# Patient Record
Sex: MALE | Race: WHITE | NOT HISPANIC OR LATINO
[De-identification: names, ages, dates, MRNs, and addresses within clinical notes are randomized per-mention and may not be internally consistent; named-entity substitution may affect disease eponyms.]

---

## 2017-01-17 ENCOUNTER — APPOINTMENT (OUTPATIENT)
Dept: INTERNAL MEDICINE | Facility: CLINIC | Age: 62
End: 2017-01-17

## 2017-03-07 ENCOUNTER — EMERGENCY (EMERGENCY)
Facility: HOSPITAL | Age: 62
LOS: 1 days | Discharge: HOME | End: 2017-03-07
Attending: EMERGENCY MEDICINE | Admitting: EMERGENCY MEDICINE
Payer: COMMERCIAL

## 2017-03-07 VITALS — WEIGHT: 209 LBS

## 2017-03-07 DIAGNOSIS — T18.9XXA FOREIGN BODY OF ALIMENTARY TRACT, PART UNSPECIFIED, INITIAL ENCOUNTER: ICD-10-CM

## 2017-03-07 LAB
ALBUMIN SERPL ELPH-MCNC: 5 G/DL — SIGNIFICANT CHANGE UP (ref 3.3–5.2)
ALP SERPL-CCNC: 69 U/L — SIGNIFICANT CHANGE UP (ref 40–120)
ALT FLD-CCNC: 23 U/L — SIGNIFICANT CHANGE UP
ANION GAP SERPL CALC-SCNC: 13 MMOL/L — SIGNIFICANT CHANGE UP (ref 5–17)
APPEARANCE UR: CLEAR — SIGNIFICANT CHANGE UP
APTT BLD: 39.1 SEC — HIGH (ref 27.5–37.4)
AST SERPL-CCNC: 21 U/L — SIGNIFICANT CHANGE UP
BASOPHILS # BLD AUTO: 0 K/UL — SIGNIFICANT CHANGE UP (ref 0–0.2)
BASOPHILS NFR BLD AUTO: 0.5 % — SIGNIFICANT CHANGE UP (ref 0–2)
BILIRUB SERPL-MCNC: 0.6 MG/DL — SIGNIFICANT CHANGE UP (ref 0.4–2)
BILIRUB UR-MCNC: NEGATIVE — SIGNIFICANT CHANGE UP
BUN SERPL-MCNC: 25 MG/DL — HIGH (ref 8–20)
CALCIUM SERPL-MCNC: 9.8 MG/DL — SIGNIFICANT CHANGE UP (ref 8.6–10.2)
CHLORIDE SERPL-SCNC: 95 MMOL/L — LOW (ref 98–107)
CO2 SERPL-SCNC: 28 MMOL/L — SIGNIFICANT CHANGE UP (ref 22–29)
COLOR SPEC: YELLOW — SIGNIFICANT CHANGE UP
CREAT SERPL-MCNC: 0.8 MG/DL — SIGNIFICANT CHANGE UP (ref 0.5–1.3)
DIFF PNL FLD: NEGATIVE — SIGNIFICANT CHANGE UP
EOSINOPHIL # BLD AUTO: 0 K/UL — SIGNIFICANT CHANGE UP (ref 0–0.5)
EOSINOPHIL NFR BLD AUTO: 0.5 % — SIGNIFICANT CHANGE UP (ref 0–5)
GLUCOSE SERPL-MCNC: 110 MG/DL — SIGNIFICANT CHANGE UP (ref 70–115)
GLUCOSE UR QL: NEGATIVE MG/DL — SIGNIFICANT CHANGE UP
HCT VFR BLD CALC: 41.3 % — LOW (ref 42–52)
HGB BLD-MCNC: 14.5 G/DL — SIGNIFICANT CHANGE UP (ref 14–18)
INR BLD: 1.19 RATIO — HIGH (ref 0.88–1.16)
KETONES UR-MCNC: NEGATIVE — SIGNIFICANT CHANGE UP
LEUKOCYTE ESTERASE UR-ACNC: NEGATIVE — SIGNIFICANT CHANGE UP
LIDOCAIN IGE QN: 25 U/L — SIGNIFICANT CHANGE UP (ref 22–51)
LYMPHOCYTES # BLD AUTO: 1.3 K/UL — SIGNIFICANT CHANGE UP (ref 1–4.8)
LYMPHOCYTES # BLD AUTO: 20.3 % — SIGNIFICANT CHANGE UP (ref 20–55)
MCHC RBC-ENTMCNC: 30.2 PG — SIGNIFICANT CHANGE UP (ref 27–31)
MCHC RBC-ENTMCNC: 35.1 G/DL — SIGNIFICANT CHANGE UP (ref 32–36)
MCV RBC AUTO: 86 FL — SIGNIFICANT CHANGE UP (ref 80–94)
MONOCYTES # BLD AUTO: 0.5 K/UL — SIGNIFICANT CHANGE UP (ref 0–0.8)
MONOCYTES NFR BLD AUTO: 7.5 % — SIGNIFICANT CHANGE UP (ref 3–10)
NEUTROPHILS # BLD AUTO: 4.4 K/UL — SIGNIFICANT CHANGE UP (ref 1.8–8)
NEUTROPHILS NFR BLD AUTO: 71 % — SIGNIFICANT CHANGE UP (ref 37–73)
NITRITE UR-MCNC: NEGATIVE — SIGNIFICANT CHANGE UP
PH UR: 6 — SIGNIFICANT CHANGE UP (ref 4.8–8)
PLATELET # BLD AUTO: 225 K/UL — SIGNIFICANT CHANGE UP (ref 150–400)
POTASSIUM SERPL-MCNC: 3.9 MMOL/L — SIGNIFICANT CHANGE UP (ref 3.5–5.3)
POTASSIUM SERPL-SCNC: 3.9 MMOL/L — SIGNIFICANT CHANGE UP (ref 3.5–5.3)
PROT SERPL-MCNC: 7.9 G/DL — SIGNIFICANT CHANGE UP (ref 6.6–8.7)
PROT UR-MCNC: NEGATIVE MG/DL — SIGNIFICANT CHANGE UP
PROTHROM AB SERPL-ACNC: 13.1 SEC — SIGNIFICANT CHANGE UP (ref 10–13.1)
RBC # BLD: 4.8 M/UL — SIGNIFICANT CHANGE UP (ref 4.6–6.2)
RBC # FLD: 12.8 % — SIGNIFICANT CHANGE UP (ref 11–15.6)
SODIUM SERPL-SCNC: 136 MMOL/L — SIGNIFICANT CHANGE UP (ref 135–145)
SP GR SPEC: 1.01 — SIGNIFICANT CHANGE UP (ref 1.01–1.02)
UROBILINOGEN FLD QL: NEGATIVE MG/DL — SIGNIFICANT CHANGE UP
WBC # BLD: 6.2 K/UL — SIGNIFICANT CHANGE UP (ref 4.8–10.8)
WBC # FLD AUTO: 6.2 K/UL — SIGNIFICANT CHANGE UP (ref 4.8–10.8)

## 2017-03-07 PROCEDURE — 99284 EMERGENCY DEPT VISIT MOD MDM: CPT

## 2017-03-07 PROCEDURE — 74176 CT ABD & PELVIS W/O CONTRAST: CPT | Mod: 26

## 2017-03-07 RX ORDER — KETOROLAC TROMETHAMINE 30 MG/ML
30 SYRINGE (ML) INJECTION ONCE
Qty: 0 | Refills: 0 | Status: DISCONTINUED | OUTPATIENT
Start: 2017-03-07 | End: 2017-03-07

## 2017-03-07 RX ORDER — DOCUSATE SODIUM 100 MG
100 CAPSULE ORAL ONCE
Qty: 0 | Refills: 0 | Status: COMPLETED | OUTPATIENT
Start: 2017-03-07 | End: 2017-03-07

## 2017-03-07 RX ORDER — SODIUM CHLORIDE 9 MG/ML
3 INJECTION INTRAMUSCULAR; INTRAVENOUS; SUBCUTANEOUS ONCE
Qty: 0 | Refills: 0 | Status: COMPLETED | OUTPATIENT
Start: 2017-03-07 | End: 2017-03-07

## 2017-03-07 RX ORDER — SODIUM CHLORIDE 9 MG/ML
1000 INJECTION INTRAMUSCULAR; INTRAVENOUS; SUBCUTANEOUS
Qty: 0 | Refills: 0 | Status: DISCONTINUED | OUTPATIENT
Start: 2017-03-07 | End: 2017-03-11

## 2017-03-07 RX ORDER — ONDANSETRON 8 MG/1
4 TABLET, FILM COATED ORAL ONCE
Qty: 0 | Refills: 0 | Status: COMPLETED | OUTPATIENT
Start: 2017-03-07 | End: 2017-03-07

## 2017-03-07 RX ADMIN — SODIUM CHLORIDE 125 MILLILITER(S): 9 INJECTION INTRAMUSCULAR; INTRAVENOUS; SUBCUTANEOUS at 20:04

## 2017-03-07 RX ADMIN — SODIUM CHLORIDE 3 MILLILITER(S): 9 INJECTION INTRAMUSCULAR; INTRAVENOUS; SUBCUTANEOUS at 19:20

## 2017-03-07 NOTE — CONSULT NOTE ADULT - ATTENDING COMMENTS
The patient was seen and examined  Details per the resident's H&P  This is a 62-year old man who presents to the ED because of a concern for FB ingestion  The patient thinks he ingested pieces of a broken mug (Glass)  No other complaints    Exam:  Afebrile  Abdomen is soft, non-tender    CT imaging noted/reviewed    Impression:  FB ingestion    Plan:  Discharge home  Discussed signs and symptoms to return to the ED--i.e., any abdominal pain  Reassured patient that the fragments will pass without problems

## 2017-03-07 NOTE — ED STATDOCS - PROGRESS NOTE DETAILS
PA NOTE: Pt seen by intake physician and orders/plan reviewed. PT is a 61 yo male with pMHX of HTN presenting to ED with complaints of possible glass ingestion. pt reports a glass broke sunday and he thought he possibly could  have swallowed the shard due to inability to find it after. pt reports stabbing abdominal pain at baseline but reports stabbing pain that moved today from RUQ to LLQ. pt denies fever, chills, cp, sob, melena, N/V/D. NKDA  PE: GEN: Awake, alert,  NAD,  CARDIAC: Reg rate and rhythm, S1,S2, RRR  RESP: No distress noted. Lungs CTA bilaterally no wheeze, ronchi, rales. ABD: + bsx4 soft, supple, non-tender, no guarding. . NEURO: AOx3, no focal deficits   PLAN: Pt is a 63yo male with possible FB ingestion. CT pending. Labs pending. will re-evaluate. Pt stable. CT shows possible glass in bowel. surgery consulted and will come see pt. Pt seen and evaluated by surgery. Surgery advised pt can go home and follow up out pt. they advised pt to return for worsening symptoms. will d./c pt pt verbalized understanding and agreement with plan and dx.

## 2017-03-07 NOTE — ED ADULT TRIAGE NOTE - CHIEF COMPLAINT QUOTE
pt reports pian to his left lower abdomen since Sunday. pt thinks he may have ingested a shard of glass on saturday. pt denies nausea vomiting or disrrhea

## 2017-03-07 NOTE — CONSULT NOTE ADULT - SUBJECTIVE AND OBJECTIVE BOX
63 y/o M with PMH of HTN presented with h/o possible ingestion of a piece if glass on Sunday. He states that a piece of glass was broken and when he went to clean it then next day, he couldnt find it and thought he accidently ingested it while drinking. he denies any discomfort while he was drinking. he says that he has not seen it passed in the stool and now complaints of some mild discomfort over his abdomen. he denies nausea/vomitng  stool/urine normal    OE:  pt alert, Ox3  Chest: CTAB, S1S2  abd: soft, non tender, non distended  no guarding and no rigidity    Plain CT: Several small radiopacities are noted within the mid jejunum possibly  representing swallowed glass fragments. No evidence of inflammation or  perforation. Otherwise unremarkable exam..

## 2017-03-07 NOTE — ED STATDOCS - NS ED MD SCRIBE ATTENDING SCRIBE SECTIONS
PAST MEDICAL/SURGICAL/SOCIAL HISTORY/HISTORY OF PRESENT ILLNESS/REVIEW OF SYSTEMS/PHYSICAL EXAM/INTAKE ASSESSMENT/SCREENINGS/VITAL SIGNS( Pullset)/DISPOSITION/HIV

## 2017-03-07 NOTE — ED STATDOCS - ATTENDING CONTRIBUTION TO CARE
I, Amos Yanez, performed the initial face to face bedside interview with this patient regarding history of present illness, review of symptoms and relevant past medical, social and family history.  I completed an independent physical examination.  I was the initial provider who evaluated this patient. I have signed out the follow up of any pending tests (i.e. labs, radiological studies) to the ACP.  I have communicated the patient’s plan of care and disposition with the ACP.  The history, relevant review of systems, past medical and surgical history, medical decision making, and physical examination was documented by the scribe in my presence and I attest to the accuracy of the documentation.

## 2017-03-07 NOTE — CONSULT NOTE ADULT - PROBLEM SELECTOR RECOMMENDATION 9
pt to be discharged home  return to ED if having severe abd pain, nausea, vomiting , blood in stool  pt explained about the plan

## 2017-03-07 NOTE — ED STATDOCS - OBJECTIVE STATEMENT
63 y/o M pt presents to ED c/o abdominal pain. Pt states he possible ingested a piece of glass 3 days ago. Last movement today. No nausea, vomiting, diarrhea. No rectal bleeding. No further complaints at this time. 61 y/o M pt presents to ED c/o abdominal pain. Pt states he possibly ingested a piece of glass 3 days ago. Last movement today. No nausea, vomiting, diarrhea. No rectal bleeding. No further complaints at this time.

## 2017-03-08 ENCOUNTER — APPOINTMENT (OUTPATIENT)
Dept: INTERNAL MEDICINE | Facility: CLINIC | Age: 62
End: 2017-03-08

## 2017-03-08 VITALS
OXYGEN SATURATION: 98 % | DIASTOLIC BLOOD PRESSURE: 62 MMHG | SYSTOLIC BLOOD PRESSURE: 128 MMHG | HEART RATE: 82 BPM | RESPIRATION RATE: 17 BRPM | TEMPERATURE: 99 F

## 2017-03-08 PROCEDURE — 80053 COMPREHEN METABOLIC PANEL: CPT

## 2017-03-08 PROCEDURE — 81003 URINALYSIS AUTO W/O SCOPE: CPT

## 2017-03-08 PROCEDURE — 85027 COMPLETE CBC AUTOMATED: CPT

## 2017-03-08 PROCEDURE — 83690 ASSAY OF LIPASE: CPT

## 2017-03-08 PROCEDURE — 99284 EMERGENCY DEPT VISIT MOD MDM: CPT | Mod: 25

## 2017-03-08 PROCEDURE — 74176 CT ABD & PELVIS W/O CONTRAST: CPT

## 2017-03-08 PROCEDURE — 85730 THROMBOPLASTIN TIME PARTIAL: CPT

## 2017-03-08 PROCEDURE — 85610 PROTHROMBIN TIME: CPT

## 2017-03-08 RX ADMIN — Medication 100 MILLIGRAM(S): at 00:02

## 2017-03-08 NOTE — ED ADULT NURSE REASSESSMENT NOTE - NS ED NURSE REASSESS COMMENT FT1
Pt received in chair in Ed results waiting area. Pt is awaiting vital signs and DC paper work. All provided to pt. Pt denies pain at this time. No apparent distress noted.

## 2017-06-28 ENCOUNTER — APPOINTMENT (OUTPATIENT)
Dept: INTERNAL MEDICINE | Facility: CLINIC | Age: 62
End: 2017-06-28

## 2017-08-29 ENCOUNTER — APPOINTMENT (OUTPATIENT)
Dept: INTERNAL MEDICINE | Facility: CLINIC | Age: 62
End: 2017-08-29

## 2017-10-31 ENCOUNTER — APPOINTMENT (OUTPATIENT)
Dept: GASTROENTEROLOGY | Facility: CLINIC | Age: 62
End: 2017-10-31

## 2018-02-27 ENCOUNTER — APPOINTMENT (OUTPATIENT)
Dept: GASTROENTEROLOGY | Facility: CLINIC | Age: 63
End: 2018-02-27
Payer: COMMERCIAL

## 2018-02-27 VITALS
DIASTOLIC BLOOD PRESSURE: 77 MMHG | WEIGHT: 195 LBS | SYSTOLIC BLOOD PRESSURE: 122 MMHG | HEART RATE: 73 BPM | BODY MASS INDEX: 26.41 KG/M2 | RESPIRATION RATE: 14 BRPM | HEIGHT: 72 IN

## 2018-02-27 PROCEDURE — 82270 OCCULT BLOOD FECES: CPT

## 2018-02-27 PROCEDURE — 99202 OFFICE O/P NEW SF 15 MIN: CPT

## 2018-03-27 ENCOUNTER — OUTPATIENT (OUTPATIENT)
Dept: OUTPATIENT SERVICES | Facility: HOSPITAL | Age: 63
LOS: 1 days | End: 2018-03-27
Payer: COMMERCIAL

## 2018-03-27 ENCOUNTER — APPOINTMENT (OUTPATIENT)
Dept: GASTROENTEROLOGY | Facility: GI CENTER | Age: 63
End: 2018-03-27
Payer: COMMERCIAL

## 2018-03-27 DIAGNOSIS — Z83.71 FAMILY HISTORY OF COLONIC POLYPS: ICD-10-CM

## 2018-03-27 PROCEDURE — 45378 DIAGNOSTIC COLONOSCOPY: CPT

## 2018-03-27 PROCEDURE — 45380 COLONOSCOPY AND BIOPSY: CPT | Mod: PT

## 2018-03-29 ENCOUNTER — CLINICAL ADVICE (OUTPATIENT)
Age: 63
End: 2018-03-29

## 2018-04-11 ENCOUNTER — APPOINTMENT (OUTPATIENT)
Dept: INTERNAL MEDICINE | Facility: CLINIC | Age: 63
End: 2018-04-11

## 2018-04-12 ENCOUNTER — APPOINTMENT (OUTPATIENT)
Dept: INTERNAL MEDICINE | Facility: CLINIC | Age: 63
End: 2018-04-12
Payer: COMMERCIAL

## 2018-04-12 PROCEDURE — 99213 OFFICE O/P EST LOW 20 MIN: CPT

## 2018-07-18 ENCOUNTER — RECORD ABSTRACTING (OUTPATIENT)
Age: 63
End: 2018-07-18

## 2018-07-18 DIAGNOSIS — Z78.9 OTHER SPECIFIED HEALTH STATUS: ICD-10-CM

## 2018-08-01 ENCOUNTER — APPOINTMENT (OUTPATIENT)
Dept: INTERNAL MEDICINE | Facility: CLINIC | Age: 63
End: 2018-08-01
Payer: COMMERCIAL

## 2018-08-01 VITALS
DIASTOLIC BLOOD PRESSURE: 80 MMHG | HEIGHT: 72 IN | SYSTOLIC BLOOD PRESSURE: 140 MMHG | WEIGHT: 174 LBS | BODY MASS INDEX: 23.57 KG/M2

## 2018-08-01 PROBLEM — Z83.71 FAMILY HISTORY OF COLONIC POLYPS: Status: ACTIVE | Noted: 2018-02-27

## 2018-08-01 PROBLEM — Z86.79 HISTORY OF HYPERTENSION: Status: RESOLVED | Noted: 2018-02-27 | Resolved: 2018-08-01

## 2018-08-01 PROCEDURE — 99213 OFFICE O/P EST LOW 20 MIN: CPT

## 2018-08-01 RX ORDER — VALSARTAN 320 MG/1
320 TABLET ORAL
Refills: 0 | Status: DISCONTINUED | COMMUNITY
End: 2018-08-01

## 2018-08-01 RX ORDER — SODIUM SULFATE, POTASSIUM SULFATE, MAGNESIUM SULFATE 17.5; 3.13; 1.6 G/ML; G/ML; G/ML
17.5-3.13-1.6 SOLUTION, CONCENTRATE ORAL
Qty: 1 | Refills: 0 | Status: DISCONTINUED | COMMUNITY
Start: 2018-02-27 | End: 2018-08-01

## 2018-08-01 RX ORDER — HYDROCHLOROTHIAZIDE 25 MG/1
25 TABLET ORAL
Refills: 0 | Status: DISCONTINUED | COMMUNITY
End: 2018-08-01

## 2018-08-01 NOTE — HISTORY OF PRESENT ILLNESS
[FreeTextEntry1] : check up\par off bp meds since 03/18 - vol weight loss [de-identified] : Patient here for followup on his hypertension. Although S. year he is voluntarily lost more than 50 pounds and is off all antihypertensives. He had a colonoscopy in March of 2018 where his blood pressure was low and he has been off Diovan since then.\par \par He monitors his blood pressure at home and has been within normal limits. He has no complaints of headache or any other symptoms and offers no new complaints

## 2018-08-01 NOTE — PLAN
[FreeTextEntry1] : Patient examined and adjustments to therapy for HBP may be required since his blood pressure today was borderline at 140/75 All labs reviewed with patient as well. Review of systems and physical exam discussed with patient. Care plan for future followups discussed with patient. All issues of health for the current year discussed in depth with patient who was conversant and all relevant issues addressed.\par Patient has a history of prediabetes and his A1c will be repeated. He will followup for complete physical in approximately 4 months at which point his blood pressure will be rechecked

## 2018-08-02 ENCOUNTER — APPOINTMENT (OUTPATIENT)
Dept: DERMATOLOGY | Facility: CLINIC | Age: 63
End: 2018-08-02
Payer: COMMERCIAL

## 2018-08-02 DIAGNOSIS — Z86.79 PERSONAL HISTORY OF OTHER DISEASES OF THE CIRCULATORY SYSTEM: ICD-10-CM

## 2018-08-02 DIAGNOSIS — Z83.71 FAMILY HISTORY OF COLONIC POLYPS: ICD-10-CM

## 2018-08-02 PROCEDURE — 99213 OFFICE O/P EST LOW 20 MIN: CPT | Mod: 25

## 2018-08-02 PROCEDURE — 17110 DESTRUCTION B9 LES UP TO 14: CPT

## 2018-08-28 ENCOUNTER — MEDICATION RENEWAL (OUTPATIENT)
Age: 63
End: 2018-08-28

## 2019-01-10 ENCOUNTER — APPOINTMENT (OUTPATIENT)
Dept: INTERNAL MEDICINE | Facility: CLINIC | Age: 64
End: 2019-01-10
Payer: COMMERCIAL

## 2019-01-10 ENCOUNTER — NON-APPOINTMENT (OUTPATIENT)
Age: 64
End: 2019-01-10

## 2019-01-10 VITALS
SYSTOLIC BLOOD PRESSURE: 145 MMHG | BODY MASS INDEX: 23.84 KG/M2 | WEIGHT: 176 LBS | HEIGHT: 72 IN | DIASTOLIC BLOOD PRESSURE: 85 MMHG

## 2019-01-10 PROCEDURE — 93000 ELECTROCARDIOGRAM COMPLETE: CPT

## 2019-01-10 PROCEDURE — G0444 DEPRESSION SCREEN ANNUAL: CPT

## 2019-01-10 PROCEDURE — 99396 PREV VISIT EST AGE 40-64: CPT | Mod: 25

## 2019-01-10 PROCEDURE — 36415 COLL VENOUS BLD VENIPUNCTURE: CPT

## 2019-01-10 NOTE — HEALTH RISK ASSESSMENT
[Excellent] : ~his/her~  mood as  excellent [No falls in past year] : Patient reported no falls in the past year [0] : 2) Feeling down, depressed, or hopeless: Not at all (0) [Patient reported colonoscopy was normal] : Patient reported colonoscopy was normal [HIV test declined] : HIV test declined [Hepatitis C test declined] : Hepatitis C test declined [None] : None [] : No [[id number]] : 0 [Change in mental status noted] : No change in mental status noted [Language] : denies difficulty with language [Handling Complex Tasks] : denies difficulty handling complex tasks [ColonoscopyDate] : 03/01/2018 [HepatitisCDate] : 10/01/2017 [HepatitisCComments] : neg

## 2019-01-10 NOTE — HISTORY OF PRESENT ILLNESS
[FreeTextEntry1] : wellness and h/o hbp and pre dm [de-identified] : WEIGHT STILL DOWN - 70 POUNDS DOWN ALL VOLUNTARY\par HIGH VIT D LAST VISIT - ON 2000 UNITS/DAY\par He is an patient is here for his routine yearly wellness and evaluation of his hypertension. Patient is aggressively involuntarily lost 70 pounds in the last 18 months. He was previously prediabetic and is here now for evaluation. He is up-to-date with vaccine but was unable to get shingrix to his pharmacy. He has recently had a colonoscopy which was negative.\par He is a little too liberal with his salt usage but otherwise is asymptomatic

## 2019-01-10 NOTE — PLAN
[FreeTextEntry1] : Wellness exam completed. All issues of health and screening up to date. Immunizations and screening reviewed with patient. All issues of health for the current year discussed in depth with patient. Patient was conversant during the exam and all pertinent issues addressed. Depression screen zero in chart. Fall prevention was addressed.\par \par Patient examined and adjustments to therapy for HBP was discussed in detail with the patient and his blood pressure taken by me consistently was 145/85 both arms recumbent. It was my suggestion that he can monitor it at home but should strongly reconsider beginning losartan 50 mg a day which was prescribed and sent to his drugstore. They're long conversation about the significance of his elevated hypertension and may have no bearing on his weight loss. \par \par Results of current evaluation discussed with patient. Medications were reviewed and all relevant labs as well as a 1C level and glucose levels were discussed in depth with  patient. Further options for ideal control were discussed, long-term complications of diabetes and screening for complications were also discussed. Patient was conversant and all relevant questions were asked and answered. Patient is not on any medications and his last lab he was borderline prediabetic. Considering his weight loss that should not be initiated today\par \par \par All labs reviewed with patient as well. Review of systems and physical exam discussed with patient. Care plan for future followups discussed with patient. All issues of health for the current year discussed in depth with patient who was conversant and all relevant issues addressed.

## 2019-01-13 LAB
25(OH)D3 SERPL-MCNC: 34.3 NG/ML
ALBUMIN SERPL ELPH-MCNC: 5.2 G/DL
ALP BLD-CCNC: 67 U/L
ALT SERPL-CCNC: 23 U/L
ANION GAP SERPL CALC-SCNC: 15 MMOL/L
APPEARANCE: CLEAR
AST SERPL-CCNC: 22 U/L
BACTERIA: NEGATIVE
BASOPHILS # BLD AUTO: 0.04 K/UL
BASOPHILS NFR BLD AUTO: 0.8 %
BILIRUB SERPL-MCNC: 1 MG/DL
BILIRUBIN URINE: NEGATIVE
BLOOD URINE: NEGATIVE
BUN SERPL-MCNC: 13 MG/DL
CALCIUM SERPL-MCNC: 9.9 MG/DL
CHLORIDE SERPL-SCNC: 99 MMOL/L
CHOLEST SERPL-MCNC: 146 MG/DL
CHOLEST/HDLC SERPL: 3 RATIO
CO2 SERPL-SCNC: 27 MMOL/L
COLOR: YELLOW
CREAT SERPL-MCNC: 0.89 MG/DL
EOSINOPHIL # BLD AUTO: 0.04 K/UL
EOSINOPHIL NFR BLD AUTO: 0.8 %
GLUCOSE QUALITATIVE U: NEGATIVE MG/DL
GLUCOSE SERPL-MCNC: 94 MG/DL
HBA1C MFR BLD HPLC: 5.5 %
HCT VFR BLD CALC: 49.2 %
HDLC SERPL-MCNC: 49 MG/DL
HGB BLD-MCNC: 16.7 G/DL
IMM GRANULOCYTES NFR BLD AUTO: 0.2 %
KETONES URINE: NEGATIVE
LDLC SERPL CALC-MCNC: 84 MG/DL
LEUKOCYTE ESTERASE URINE: NEGATIVE
LYMPHOCYTES # BLD AUTO: 1.02 K/UL
LYMPHOCYTES NFR BLD AUTO: 21.2 %
MAN DIFF?: NORMAL
MCHC RBC-ENTMCNC: 30.4 PG
MCHC RBC-ENTMCNC: 33.9 GM/DL
MCV RBC AUTO: 89.5 FL
MICROSCOPIC-UA: NORMAL
MONOCYTES # BLD AUTO: 0.38 K/UL
MONOCYTES NFR BLD AUTO: 7.9 %
NEUTROPHILS # BLD AUTO: 3.33 K/UL
NEUTROPHILS NFR BLD AUTO: 69.1 %
NITRITE URINE: NEGATIVE
PH URINE: 6
PLATELET # BLD AUTO: 220 K/UL
POTASSIUM SERPL-SCNC: 4.1 MMOL/L
PROT SERPL-MCNC: 7.6 G/DL
PROTEIN URINE: NEGATIVE MG/DL
PSA SERPL-MCNC: 0.7 NG/ML
RBC # BLD: 5.5 M/UL
RBC # FLD: 12.9 %
RED BLOOD CELLS URINE: 0 /HPF
SODIUM SERPL-SCNC: 141 MMOL/L
SPECIFIC GRAVITY URINE: 1
SQUAMOUS EPITHELIAL CELLS: 0 /HPF
TRIGL SERPL-MCNC: 63 MG/DL
TSH SERPL-ACNC: 2.98 UIU/ML
UROBILINOGEN URINE: NEGATIVE MG/DL
WBC # FLD AUTO: 4.82 K/UL
WHITE BLOOD CELLS URINE: 0 /HPF

## 2019-02-13 LAB — HEMOCCULT STL QL IA: NEGATIVE

## 2019-02-21 ENCOUNTER — APPOINTMENT (OUTPATIENT)
Dept: INTERNAL MEDICINE | Facility: CLINIC | Age: 64
End: 2019-02-21
Payer: COMMERCIAL

## 2019-02-21 VITALS
WEIGHT: 179 LBS | SYSTOLIC BLOOD PRESSURE: 150 MMHG | BODY MASS INDEX: 24.24 KG/M2 | DIASTOLIC BLOOD PRESSURE: 85 MMHG | HEIGHT: 72 IN

## 2019-02-21 PROCEDURE — 99213 OFFICE O/P EST LOW 20 MIN: CPT

## 2019-02-21 NOTE — PLAN
[FreeTextEntry1] : The patient has systolic hypertension and borderline diastolic it over 80. Based on new guidelines as discussed with patient I believe he requires therapy. He will begin losartan 50 mg once a day and followup in 2 months. All issues relating medications was discussed with the patient and he understands.\par \par All issues regarding patient's health and medical problems have been discussed. The patient understands and concurs with the treatment plan.

## 2019-02-21 NOTE — HISTORY OF PRESENT ILLNESS
[FreeTextEntry1] : elevated blood pressure [de-identified] : BP MONITORING AT HOME STILL OVER 80 DIAST

## 2019-04-18 ENCOUNTER — APPOINTMENT (OUTPATIENT)
Dept: INTERNAL MEDICINE | Facility: CLINIC | Age: 64
End: 2019-04-18

## 2019-04-23 ENCOUNTER — FORM ENCOUNTER (OUTPATIENT)
Age: 64
End: 2019-04-23

## 2019-06-03 ENCOUNTER — APPOINTMENT (OUTPATIENT)
Dept: INTERNAL MEDICINE | Facility: CLINIC | Age: 64
End: 2019-06-03
Payer: COMMERCIAL

## 2019-06-03 VITALS
HEIGHT: 72 IN | SYSTOLIC BLOOD PRESSURE: 130 MMHG | DIASTOLIC BLOOD PRESSURE: 80 MMHG | WEIGHT: 176 LBS | BODY MASS INDEX: 23.84 KG/M2

## 2019-06-03 PROCEDURE — 99213 OFFICE O/P EST LOW 20 MIN: CPT

## 2019-06-03 RX ORDER — ASPIRIN 81 MG
81 TABLET, DELAYED RELEASE (ENTERIC COATED) ORAL DAILY
Refills: 0 | Status: COMPLETED | COMMUNITY
End: 2019-06-03

## 2019-06-03 NOTE — PHYSICAL EXAM
[No Acute Distress] : no acute distress [Well Nourished] : well nourished [Well-Appearing] : well-appearing [Well Developed] : well developed [PERRL] : pupils equal round and reactive to light [Normal Sclera/Conjunctiva] : normal sclera/conjunctiva [EOMI] : extraocular movements intact [Normal Outer Ear/Nose] : the outer ears and nose were normal in appearance [Normal Oropharynx] : the oropharynx was normal [Supple] : supple [No JVD] : no jugular venous distention [Thyroid Normal, No Nodules] : the thyroid was normal and there were no nodules present [No Lymphadenopathy] : no lymphadenopathy [No Respiratory Distress] : no respiratory distress  [Clear to Auscultation] : lungs were clear to auscultation bilaterally [No Accessory Muscle Use] : no accessory muscle use [Normal Rate] : normal rate  [Regular Rhythm] : with a regular rhythm [Normal S1, S2] : normal S1 and S2 [No Carotid Bruits] : no carotid bruits [No Murmur] : no murmur heard [No Abdominal Bruit] : a ~M bruit was not heard ~T in the abdomen [No Varicosities] : no varicosities [Pedal Pulses Present] : the pedal pulses are present [No Edema] : there was no peripheral edema [No Extremity Clubbing/Cyanosis] : no extremity clubbing/cyanosis [No Palpable Aorta] : no palpable aorta [Soft] : abdomen soft [Non Tender] : non-tender [Non-distended] : non-distended [No Masses] : no abdominal mass palpated [No HSM] : no HSM [Normal Bowel Sounds] : normal bowel sounds [Normal Posterior Cervical Nodes] : no posterior cervical lymphadenopathy [Normal Anterior Cervical Nodes] : no anterior cervical lymphadenopathy [No CVA Tenderness] : no CVA  tenderness [No Spinal Tenderness] : no spinal tenderness [No Joint Swelling] : no joint swelling [Grossly Normal Strength/Tone] : grossly normal strength/tone [No Rash] : no rash [Normal Gait] : normal gait [Coordination Grossly Intact] : coordination grossly intact [Deep Tendon Reflexes (DTR)] : deep tendon reflexes were 2+ and symmetric [No Focal Deficits] : no focal deficits [Normal Insight/Judgement] : insight and judgment were intact [Normal Affect] : the affect was normal

## 2019-06-03 NOTE — HISTORY OF PRESENT ILLNESS
[FreeTextEntry1] : BP FOLLOW UP [de-identified] : OFF ALL MEDS SINCE ON DIET \par Patient here for routine evaluation. He is currently on no medication. He lost approximately 70 pounds voluntarily and has normal blood pressure with a home monitor.

## 2019-06-03 NOTE — PLAN
[FreeTextEntry1] : Patient is no longer prediabetic with a normal A1c.\par \par Patient is also normotensive multiple examinations. It is slightly higher here but normal with readings at home. No drug intervention is required. Patient counseled regarding continued weight loss and healthy healing. He will followup in January 2020and exam.All issues regarding patient's health and medical problems have been discussed. The patient understands and concurs with the treatment plan.

## 2019-06-10 ENCOUNTER — APPOINTMENT (OUTPATIENT)
Dept: INTERNAL MEDICINE | Facility: CLINIC | Age: 64
End: 2019-06-10

## 2019-08-26 ENCOUNTER — APPOINTMENT (OUTPATIENT)
Dept: DERMATOLOGY | Facility: CLINIC | Age: 64
End: 2019-08-26
Payer: COMMERCIAL

## 2019-08-26 PROCEDURE — 11102 TANGNTL BX SKIN SINGLE LES: CPT

## 2019-08-26 PROCEDURE — 99214 OFFICE O/P EST MOD 30 MIN: CPT | Mod: 25

## 2020-02-12 PROBLEM — Z13.29 SCREENING FOR THYROID DISORDER: Status: ACTIVE | Noted: 2019-01-10

## 2020-02-13 ENCOUNTER — APPOINTMENT (OUTPATIENT)
Dept: INTERNAL MEDICINE | Facility: CLINIC | Age: 65
End: 2020-02-13
Payer: COMMERCIAL

## 2020-02-13 ENCOUNTER — NON-APPOINTMENT (OUTPATIENT)
Age: 65
End: 2020-02-13

## 2020-02-13 VITALS
HEIGHT: 72 IN | BODY MASS INDEX: 24.38 KG/M2 | DIASTOLIC BLOOD PRESSURE: 70 MMHG | SYSTOLIC BLOOD PRESSURE: 150 MMHG | WEIGHT: 180 LBS

## 2020-02-13 DIAGNOSIS — Z13.29 ENCOUNTER FOR SCREENING FOR OTHER SUSPECTED ENDOCRINE DISORDER: ICD-10-CM

## 2020-02-13 DIAGNOSIS — Z13.1 ENCOUNTER FOR SCREENING FOR DIABETES MELLITUS: ICD-10-CM

## 2020-02-13 PROCEDURE — 36415 COLL VENOUS BLD VENIPUNCTURE: CPT

## 2020-02-13 PROCEDURE — 93000 ELECTROCARDIOGRAM COMPLETE: CPT | Mod: 59

## 2020-02-13 PROCEDURE — 99396 PREV VISIT EST AGE 40-64: CPT | Mod: 25

## 2020-02-13 PROCEDURE — G0444 DEPRESSION SCREEN ANNUAL: CPT

## 2020-02-13 NOTE — PHYSICAL EXAM
[No Acute Distress] : no acute distress [Well Nourished] : well nourished [Well Developed] : well developed [Well-Appearing] : well-appearing [Normal Sclera/Conjunctiva] : normal sclera/conjunctiva [PERRL] : pupils equal round and reactive to light [EOMI] : extraocular movements intact [Normal Outer Ear/Nose] : the outer ears and nose were normal in appearance [Normal Oropharynx] : the oropharynx was normal [No JVD] : no jugular venous distention [No Lymphadenopathy] : no lymphadenopathy [Supple] : supple [Thyroid Normal, No Nodules] : the thyroid was normal and there were no nodules present [No Accessory Muscle Use] : no accessory muscle use [No Respiratory Distress] : no respiratory distress  [Clear to Auscultation] : lungs were clear to auscultation bilaterally [Regular Rhythm] : with a regular rhythm [Normal Rate] : normal rate  [Normal S1, S2] : normal S1 and S2 [No Carotid Bruits] : no carotid bruits [No Abdominal Bruit] : a ~M bruit was not heard ~T in the abdomen [No Murmur] : no murmur heard [No Varicosities] : no varicosities [Pedal Pulses Present] : the pedal pulses are present [No Edema] : there was no peripheral edema [No Extremity Clubbing/Cyanosis] : no extremity clubbing/cyanosis [No Palpable Aorta] : no palpable aorta [Soft] : abdomen soft [Non Tender] : non-tender [No Masses] : no abdominal mass palpated [Non-distended] : non-distended [No HSM] : no HSM [Normal Bowel Sounds] : normal bowel sounds [Normal Posterior Cervical Nodes] : no posterior cervical lymphadenopathy [Normal Anterior Cervical Nodes] : no anterior cervical lymphadenopathy [No Spinal Tenderness] : no spinal tenderness [No CVA Tenderness] : no CVA  tenderness [No Joint Swelling] : no joint swelling [Grossly Normal Strength/Tone] : grossly normal strength/tone [Coordination Grossly Intact] : coordination grossly intact [No Rash] : no rash [No Focal Deficits] : no focal deficits [Normal Gait] : normal gait [Deep Tendon Reflexes (DTR)] : deep tendon reflexes were 2+ and symmetric [Normal Affect] : the affect was normal [Normal Insight/Judgement] : insight and judgment were intact

## 2020-02-13 NOTE — PLAN
[FreeTextEntry1] : Wellness exam completed. All issues of health and screening up to date. Immunizations and screening reviewed with patient. All issues of health for the current year discussed in depth with patient. Patient was conversant during the exam and all pertinent issues addressed. Depression screen 0 in chart. Fall prevention was addressed.\par \par Patient with significant white coat hypertension and blood pressure needed to be repeated several times. I reviewed his log from home where his pressures are normal.\par Repeat blood pressure in the office was 140/80. Okay to observe off all therapy\par \par

## 2020-02-13 NOTE — HISTORY OF PRESENT ILLNESS
[FreeTextEntry1] : wellness and Patient here in follow up to last visit and prior issue\par  [de-identified] : Flashes l eye x 1 - no recurrence - to see opthal\par \par Patient up-to-date with his vaccines compliant with all medications. He is up-to-date in all screening. And low PSA last year and followup testing will be deferred for now.\par He has no complaints chest pain shortness of breath. The only issue is her hypertension she monitors at home and is normal

## 2020-02-16 LAB
ALBUMIN SERPL ELPH-MCNC: 5.4 G/DL
ALP BLD-CCNC: 63 U/L
ALT SERPL-CCNC: 13 U/L
ANION GAP SERPL CALC-SCNC: 17 MMOL/L
APPEARANCE: CLEAR
AST SERPL-CCNC: 19 U/L
BACTERIA: NEGATIVE
BASOPHILS # BLD AUTO: 0.07 K/UL
BASOPHILS NFR BLD AUTO: 1.6 %
BILIRUB SERPL-MCNC: 1.2 MG/DL
BILIRUBIN URINE: NEGATIVE
BLOOD URINE: NEGATIVE
BUN SERPL-MCNC: 12 MG/DL
CALCIUM SERPL-MCNC: 10.1 MG/DL
CHLORIDE SERPL-SCNC: 96 MMOL/L
CHOLEST SERPL-MCNC: 155 MG/DL
CHOLEST/HDLC SERPL: 3.1 RATIO
CO2 SERPL-SCNC: 26 MMOL/L
COLOR: COLORLESS
CREAT SERPL-MCNC: 0.79 MG/DL
EOSINOPHIL # BLD AUTO: 0.05 K/UL
EOSINOPHIL NFR BLD AUTO: 1.2 %
ESTIMATED AVERAGE GLUCOSE: 105 MG/DL
GLUCOSE QUALITATIVE U: NEGATIVE
GLUCOSE SERPL-MCNC: 91 MG/DL
HBA1C MFR BLD HPLC: 5.3 %
HCT VFR BLD CALC: 49.8 %
HDLC SERPL-MCNC: 50 MG/DL
HGB BLD-MCNC: 16.4 G/DL
HYALINE CASTS: 0 /LPF
IMM GRANULOCYTES NFR BLD AUTO: 0.2 %
KETONES URINE: ABNORMAL
LDLC SERPL CALC-MCNC: 90 MG/DL
LEUKOCYTE ESTERASE URINE: NEGATIVE
LYMPHOCYTES # BLD AUTO: 1 K/UL
LYMPHOCYTES NFR BLD AUTO: 23.5 %
MAN DIFF?: NORMAL
MCHC RBC-ENTMCNC: 30.3 PG
MCHC RBC-ENTMCNC: 32.9 GM/DL
MCV RBC AUTO: 91.9 FL
MICROSCOPIC-UA: NORMAL
MONOCYTES # BLD AUTO: 0.37 K/UL
MONOCYTES NFR BLD AUTO: 8.7 %
NEUTROPHILS # BLD AUTO: 2.75 K/UL
NEUTROPHILS NFR BLD AUTO: 64.8 %
NITRITE URINE: NEGATIVE
PH URINE: 6
PLATELET # BLD AUTO: 244 K/UL
POTASSIUM SERPL-SCNC: 4 MMOL/L
PROT SERPL-MCNC: 7.5 G/DL
PROTEIN URINE: NEGATIVE
RBC # BLD: 5.42 M/UL
RBC # FLD: 12.5 %
RED BLOOD CELLS URINE: 0 /HPF
SODIUM SERPL-SCNC: 139 MMOL/L
SPECIFIC GRAVITY URINE: 1
SQUAMOUS EPITHELIAL CELLS: 0 /HPF
TRIGL SERPL-MCNC: 77 MG/DL
TSH SERPL-ACNC: 2.15 UIU/ML
UROBILINOGEN URINE: NORMAL
WBC # FLD AUTO: 4.25 K/UL
WHITE BLOOD CELLS URINE: 0 /HPF

## 2020-08-13 ENCOUNTER — APPOINTMENT (OUTPATIENT)
Dept: INTERNAL MEDICINE | Facility: CLINIC | Age: 65
End: 2020-08-13

## 2021-05-03 ENCOUNTER — APPOINTMENT (OUTPATIENT)
Dept: DERMATOLOGY | Facility: CLINIC | Age: 66
End: 2021-05-03

## 2021-08-24 ENCOUNTER — APPOINTMENT (OUTPATIENT)
Dept: DERMATOLOGY | Facility: CLINIC | Age: 66
End: 2021-08-24
Payer: COMMERCIAL

## 2021-08-24 PROCEDURE — 99213 OFFICE O/P EST LOW 20 MIN: CPT

## 2022-08-25 ENCOUNTER — APPOINTMENT (OUTPATIENT)
Dept: DERMATOLOGY | Facility: CLINIC | Age: 67
End: 2022-08-25

## 2022-11-23 ENCOUNTER — NON-APPOINTMENT (OUTPATIENT)
Age: 67
End: 2022-11-23

## 2022-11-23 ENCOUNTER — APPOINTMENT (OUTPATIENT)
Dept: CARDIOLOGY | Facility: CLINIC | Age: 67
End: 2022-11-23

## 2022-11-23 ENCOUNTER — APPOINTMENT (OUTPATIENT)
Dept: INTERNAL MEDICINE | Facility: CLINIC | Age: 67
End: 2022-11-23

## 2022-11-23 VITALS
OXYGEN SATURATION: 96 % | TEMPERATURE: 98 F | HEART RATE: 109 BPM | SYSTOLIC BLOOD PRESSURE: 139 MMHG | HEIGHT: 72 IN | BODY MASS INDEX: 26.28 KG/M2 | DIASTOLIC BLOOD PRESSURE: 81 MMHG | WEIGHT: 194 LBS

## 2022-11-23 VITALS
HEIGHT: 72 IN | WEIGHT: 188 LBS | BODY MASS INDEX: 25.47 KG/M2 | DIASTOLIC BLOOD PRESSURE: 80 MMHG | SYSTOLIC BLOOD PRESSURE: 140 MMHG

## 2022-11-23 VITALS — HEART RATE: 140 BPM

## 2022-11-23 DIAGNOSIS — R03.0 ELEVATED BLOOD-PRESSURE READING, W/OUT DIAGNOSIS OF HYPERTENSION: ICD-10-CM

## 2022-11-23 PROCEDURE — 93000 ELECTROCARDIOGRAM COMPLETE: CPT

## 2022-11-23 PROCEDURE — 99205 OFFICE O/P NEW HI 60 MIN: CPT | Mod: 25

## 2022-11-23 PROCEDURE — 36415 COLL VENOUS BLD VENIPUNCTURE: CPT

## 2022-11-23 PROCEDURE — 99215 OFFICE O/P EST HI 40 MIN: CPT | Mod: 25

## 2022-11-23 RX ORDER — DILTIAZEM HYDROCHLORIDE 180 MG/1
180 CAPSULE, EXTENDED RELEASE ORAL
Qty: 90 | Refills: 3 | Status: DISCONTINUED | COMMUNITY
Start: 2022-11-23 | End: 2022-11-23

## 2022-11-23 NOTE — REVIEW OF SYSTEMS
pregnancy [Vision Problems] : vision problems [Chest Pain] : no chest pain [Palpitations] : no palpitations [Claudication] : no  leg claudication [Orthopena] : no orthopnea [Paroxysmal Nocturnal Dyspnea] : no paroxysmal nocturnal dyspnea [Negative] : Heme/Lymph [FreeTextEntry3] : brief vision issue 20 secs - to see opthal [de-identified] : no work issue - occ forgets names

## 2022-11-23 NOTE — ASSESSMENT
[FreeTextEntry1] : Patient had new onset rapid atrial fibrillation.  EKG last done February 2020 at last visit was normal sinus rhythm EKG today is rapid atrial fibrillation rate 140.  Also raises the possibility of his transient vision loss was an episode of amaurosis that we will need to be anticoagulated urgently.  Discussed at great length with patient appointment arranged with cardiologist today for treatment.  I did discuss briefly that most likely he will need to get anticoagulated cardioverted and work-up as per cardiology.  Full laboratory data drawn today for thyroid and full labs.  Patient will follow-up with cardiology until stable and will follow up with me in January\par Patient is fully aware of the significance of his problem and is fully aware that he will be seeing cardiology at 1:00 today after telephone conversation.  EKG in chart.  Concerned about episode of vision loss which I am sure will prompt him to be anticoagulated.  All issues regarding patient's health and medical problems have been discussed. The patient understands and concurs with the treatment plan.

## 2022-11-23 NOTE — REASON FOR VISIT
[Symptom and Test Evaluation] : symptom and test evaluation [Arrhythmia/ECG Abnorrmalities] : arrhythmia/ECG abnormalities [FreeTextEntry1] : \par amaurosis fugax, mitral valve ergurgit,. and atrial fibrillation

## 2022-11-23 NOTE — DISCUSSION/SUMMARY
[EKG obtained to assist in diagnosis and management of assessed problem(s)] : EKG obtained to assist in diagnosis and management of assessed problem(s) [Patient] : the patient [Risks] : risks [Benefits] : benefits [Alternatives] : alternatives [With Me] : with me [___ Month(s)] : in [unfilled] month(s) [FreeTextEntry1] : this is a 67 year male with no significant medical history here with newly diagnosed afib.\par \par 1) atrial fibrillation :   lone.  ? possible TIA.  MRi head to evaluate for streoke. Opth assessment to evaluaet for any retinal infarcts.  for now initaite eliqwuis 5 mg Q12.  Cardizem 180 mg daily.  \par discussed about ablation as patient apprehensive about long term meds.   transthoracic echocardiogram \par Will initaite rhyhtm control with Antiarthythmic once EF is normal.  \par also evaluaet for MUNIR and cardioversion if remains in afib.  4 weeks  MCOT monitor.  \par 2) .LBBB:  cardiac cta: metoprolo before cardiac  cta \par

## 2022-11-23 NOTE — HISTORY OF PRESENT ILLNESS
[FreeTextEntry1] : amaurosis fugax, mitral valve ergurgit,. and atrial fibrillation \par \par \par this is a 67 yea rol dmale with no significant medical history here with newly diagnosed afgib. \par he has not seen his docotr for quite someitime. does not take meds.  . he takes vitamins. \par he went for a routine physical cehck.  he wanted to get a covid vaccine. and also he has MR.  \par also noted that 7 weeks ago, he had an episode where her was looking a tht eclock and he had sudfden painless loss of vision that was teransient and resolved < 1 min.\par he is feeling forgetful .  recently. \par  \par he took his Bp in the monring and 8 : 30 and . hhis heart rate was 66  when he was at Dr. mejias office. he had rapid atrial fibrillation .  and was referred to a cardiologist\par he does not feel the palppuitations. no chest pain. no dyszpnea.   tirado snot feel papitaitons. \par \par \par No smoking. alcohol 5 beers a week.    no drugs \par \par Famiyl shitory\par Mother:  no myocardial infarction . no cerebrovascular accident \par father:  no myocardial infarction . no cerebrovascular accident

## 2022-11-23 NOTE — HISTORY OF PRESENT ILLNESS
[FreeTextEntry1] : Patient here for evaluation of multiple medical problems and new issues to be discussed\par  [de-identified] : Patient's first visit in 3 years 67-year-old male who has no significant past medical history and is on no medications.  He came in for issues of concerns for mild memory loss with people's names.  He also had of interest an issue where he was unable to see 1 letter on the clock which resolved in 20 seconds.  He has no other neurological symptoms and is due to see an ophthalmologist

## 2022-11-26 LAB
ALBUMIN SERPL ELPH-MCNC: 4.9 G/DL
ALP BLD-CCNC: 56 U/L
ALT SERPL-CCNC: 14 U/L
ANION GAP SERPL CALC-SCNC: 15 MMOL/L
AST SERPL-CCNC: 20 U/L
BASOPHILS # BLD AUTO: 0.07 K/UL
BASOPHILS NFR BLD AUTO: 1.1 %
BILIRUB SERPL-MCNC: 0.9 MG/DL
BUN SERPL-MCNC: 19 MG/DL
CALCIUM SERPL-MCNC: 10.1 MG/DL
CHLORIDE SERPL-SCNC: 98 MMOL/L
CHOLEST SERPL-MCNC: 147 MG/DL
CO2 SERPL-SCNC: 26 MMOL/L
CREAT SERPL-MCNC: 1.01 MG/DL
EGFR: 83 ML/MIN/1.73M2
EOSINOPHIL # BLD AUTO: 0.05 K/UL
EOSINOPHIL NFR BLD AUTO: 0.8 %
ESTIMATED AVERAGE GLUCOSE: 108 MG/DL
GLUCOSE SERPL-MCNC: 94 MG/DL
HBA1C MFR BLD HPLC: 5.4 %
HCT VFR BLD CALC: 48.7 %
HDLC SERPL-MCNC: 41 MG/DL
HGB BLD-MCNC: 16.1 G/DL
IMM GRANULOCYTES NFR BLD AUTO: 0.2 %
LDLC SERPL CALC-MCNC: 89 MG/DL
LYMPHOCYTES # BLD AUTO: 1.13 K/UL
LYMPHOCYTES NFR BLD AUTO: 18.5 %
MAN DIFF?: NORMAL
MCHC RBC-ENTMCNC: 30.4 PG
MCHC RBC-ENTMCNC: 33.1 GM/DL
MCV RBC AUTO: 91.9 FL
MONOCYTES # BLD AUTO: 0.51 K/UL
MONOCYTES NFR BLD AUTO: 8.3 %
NEUTROPHILS # BLD AUTO: 4.34 K/UL
NEUTROPHILS NFR BLD AUTO: 71.1 %
NONHDLC SERPL-MCNC: 106 MG/DL
PLATELET # BLD AUTO: 271 K/UL
POTASSIUM SERPL-SCNC: 5.1 MMOL/L
PROT SERPL-MCNC: 7.2 G/DL
RBC # BLD: 5.3 M/UL
RBC # FLD: 13.2 %
SODIUM SERPL-SCNC: 139 MMOL/L
TRIGL SERPL-MCNC: 87 MG/DL
TSH SERPL-ACNC: 3.43 UIU/ML
WBC # FLD AUTO: 6.11 K/UL

## 2022-11-29 ENCOUNTER — OFFICE (OUTPATIENT)
Dept: URBAN - METROPOLITAN AREA CLINIC 115 | Facility: CLINIC | Age: 67
Setting detail: OPHTHALMOLOGY
End: 2022-11-29
Payer: COMMERCIAL

## 2022-11-29 DIAGNOSIS — H25.13: ICD-10-CM

## 2022-11-29 DIAGNOSIS — G45.3: ICD-10-CM

## 2022-11-29 DIAGNOSIS — H43.813: ICD-10-CM

## 2022-11-29 DIAGNOSIS — H35.033: ICD-10-CM

## 2022-11-29 PROCEDURE — 92202 OPSCPY EXTND ON/MAC DRAW: CPT | Performed by: OPHTHALMOLOGY

## 2022-11-29 PROCEDURE — 92014 COMPRE OPH EXAM EST PT 1/>: CPT | Performed by: OPHTHALMOLOGY

## 2022-11-29 PROCEDURE — 92134 CPTRZ OPH DX IMG PST SGM RTA: CPT | Performed by: OPHTHALMOLOGY

## 2022-11-29 ASSESSMENT — SPHEQUIV_DERIVED
OS_SPHEQUIV: -2
OD_SPHEQUIV: -1.875

## 2022-11-29 ASSESSMENT — REFRACTION_CURRENTRX
OS_SPHERE: -2.25
OS_CYLINDER: SPH
OS_CYLINDER: 0.00
OS_OVR_VA: 20/
OS_AXIS: 180
OS_VPRISM_DIRECTION: SV
OS_SPHERE: -2.00
OD_OVR_VA: 20/
OD_VPRISM_DIRECTION: SV
OD_SPHERE: -2.25
OD_SPHERE: -2.00
OS_VPRISM_DIRECTION: SV
OD_OVR_VA: 20/
OD_AXIS: 180
OD_CYLINDER: 0.00
OS_OVR_VA: 20/
OD_VPRISM_DIRECTION: SV
OD_CYLINDER: SPH

## 2022-11-29 ASSESSMENT — REFRACTION_AUTOREFRACTION
OD_SPHERE: -1.75
OS_CYLINDER: -0.50
OD_CYLINDER: -0.25
OS_AXIS: 134
OD_AXIS: 027
OS_SPHERE: -1.75

## 2022-11-29 ASSESSMENT — CONFRONTATIONAL VISUAL FIELD TEST (CVF)
OD_FINDINGS: FULL
OS_FINDINGS: FULL

## 2022-11-29 ASSESSMENT — TONOMETRY
OD_IOP_MMHG: 20
OS_IOP_MMHG: 20

## 2022-11-29 ASSESSMENT — VISUAL ACUITY
OD_BCVA: 20/20-1
OS_BCVA: 20/25

## 2022-11-30 ENCOUNTER — NON-APPOINTMENT (OUTPATIENT)
Age: 67
End: 2022-11-30

## 2022-12-05 ENCOUNTER — APPOINTMENT (OUTPATIENT)
Dept: CARDIOLOGY | Facility: CLINIC | Age: 67
End: 2022-12-05

## 2022-12-06 RX ORDER — DILTIAZEM HYDROCHLORIDE 180 MG/1
180 CAPSULE, EXTENDED RELEASE ORAL
Qty: 90 | Refills: 3 | Status: DISCONTINUED | COMMUNITY
Start: 2022-11-23 | End: 2022-12-06

## 2022-12-07 ENCOUNTER — APPOINTMENT (OUTPATIENT)
Dept: CARDIOLOGY | Facility: CLINIC | Age: 67
End: 2022-12-07

## 2022-12-07 ENCOUNTER — OUTPATIENT (OUTPATIENT)
Dept: OUTPATIENT SERVICES | Facility: HOSPITAL | Age: 67
LOS: 1 days | End: 2022-12-07

## 2022-12-07 ENCOUNTER — APPOINTMENT (OUTPATIENT)
Dept: CT IMAGING | Facility: CLINIC | Age: 67
End: 2022-12-07

## 2022-12-07 DIAGNOSIS — I10 ESSENTIAL (PRIMARY) HYPERTENSION: ICD-10-CM

## 2022-12-07 PROCEDURE — 93306 TTE W/DOPPLER COMPLETE: CPT

## 2022-12-07 PROCEDURE — 93880 EXTRACRANIAL BILAT STUDY: CPT

## 2022-12-07 PROCEDURE — 70450 CT HEAD/BRAIN W/O DYE: CPT | Mod: 26

## 2022-12-08 RX ORDER — DILTIAZEM HYDROCHLORIDE 300 MG/1
300 CAPSULE, EXTENDED RELEASE ORAL
Qty: 90 | Refills: 3 | Status: DISCONTINUED | COMMUNITY
Start: 1900-01-01 | End: 2022-12-08

## 2022-12-09 ENCOUNTER — OUTPATIENT (OUTPATIENT)
Dept: OUTPATIENT SERVICES | Facility: HOSPITAL | Age: 67
LOS: 1 days | End: 2022-12-09
Payer: COMMERCIAL

## 2022-12-09 DIAGNOSIS — I10 ESSENTIAL (PRIMARY) HYPERTENSION: ICD-10-CM

## 2022-12-09 PROCEDURE — 75574 CT ANGIO HRT W/3D IMAGE: CPT | Mod: 26

## 2022-12-09 PROCEDURE — 75574 CT ANGIO HRT W/3D IMAGE: CPT

## 2022-12-14 ENCOUNTER — OUTPATIENT (OUTPATIENT)
Dept: OUTPATIENT SERVICES | Facility: HOSPITAL | Age: 67
LOS: 1 days | End: 2022-12-14
Payer: COMMERCIAL

## 2022-12-15 ENCOUNTER — RESULT REVIEW (OUTPATIENT)
Age: 67
End: 2022-12-15

## 2022-12-15 DIAGNOSIS — I10 ESSENTIAL (PRIMARY) HYPERTENSION: ICD-10-CM

## 2022-12-15 PROCEDURE — 0504T: CPT

## 2022-12-15 PROCEDURE — 0502T: CPT

## 2022-12-15 PROCEDURE — 0503T: CPT

## 2022-12-20 ENCOUNTER — APPOINTMENT (OUTPATIENT)
Dept: CARDIOLOGY | Facility: CLINIC | Age: 67
End: 2022-12-20

## 2022-12-20 VITALS
OXYGEN SATURATION: 96 % | HEART RATE: 96 BPM | SYSTOLIC BLOOD PRESSURE: 117 MMHG | TEMPERATURE: 98.6 F | DIASTOLIC BLOOD PRESSURE: 83 MMHG | BODY MASS INDEX: 26.28 KG/M2 | HEIGHT: 72 IN | WEIGHT: 194 LBS

## 2022-12-20 PROCEDURE — 99215 OFFICE O/P EST HI 40 MIN: CPT

## 2022-12-20 RX ORDER — METOPROLOL TARTRATE 25 MG/1
25 TABLET, FILM COATED ORAL
Qty: 3 | Refills: 3 | Status: DISCONTINUED | COMMUNITY
End: 2022-12-20

## 2022-12-27 ENCOUNTER — NON-APPOINTMENT (OUTPATIENT)
Age: 67
End: 2022-12-27

## 2022-12-28 ENCOUNTER — TRANSCRIPTION ENCOUNTER (OUTPATIENT)
Age: 67
End: 2022-12-28

## 2022-12-28 ENCOUNTER — OUTPATIENT (OUTPATIENT)
Dept: OUTPATIENT SERVICES | Facility: HOSPITAL | Age: 67
LOS: 1 days | End: 2022-12-28
Payer: COMMERCIAL

## 2022-12-28 VITALS
DIASTOLIC BLOOD PRESSURE: 67 MMHG | RESPIRATION RATE: 16 BRPM | SYSTOLIC BLOOD PRESSURE: 114 MMHG | OXYGEN SATURATION: 98 % | HEART RATE: 88 BPM

## 2022-12-28 VITALS
OXYGEN SATURATION: 97 % | WEIGHT: 184.97 LBS | HEIGHT: 72 IN | DIASTOLIC BLOOD PRESSURE: 79 MMHG | TEMPERATURE: 98 F | SYSTOLIC BLOOD PRESSURE: 121 MMHG

## 2022-12-28 DIAGNOSIS — I42.9 CARDIOMYOPATHY, UNSPECIFIED: ICD-10-CM

## 2022-12-28 LAB
ANION GAP SERPL CALC-SCNC: 13 MMOL/L — SIGNIFICANT CHANGE UP (ref 5–17)
BUN SERPL-MCNC: 13.7 MG/DL — SIGNIFICANT CHANGE UP (ref 8–20)
CALCIUM SERPL-MCNC: 9.3 MG/DL — SIGNIFICANT CHANGE UP (ref 8.4–10.5)
CHLORIDE SERPL-SCNC: 102 MMOL/L — SIGNIFICANT CHANGE UP (ref 96–108)
CO2 SERPL-SCNC: 24 MMOL/L — SIGNIFICANT CHANGE UP (ref 22–29)
CREAT SERPL-MCNC: 1.04 MG/DL — SIGNIFICANT CHANGE UP (ref 0.5–1.3)
EGFR: 80 ML/MIN/1.73M2 — SIGNIFICANT CHANGE UP
GLUCOSE SERPL-MCNC: 94 MG/DL — SIGNIFICANT CHANGE UP (ref 70–99)
HCT VFR BLD CALC: 44.3 % — SIGNIFICANT CHANGE UP (ref 39–50)
HGB BLD-MCNC: 15.2 G/DL — SIGNIFICANT CHANGE UP (ref 13–17)
MCHC RBC-ENTMCNC: 30.3 PG — SIGNIFICANT CHANGE UP (ref 27–34)
MCHC RBC-ENTMCNC: 34.3 GM/DL — SIGNIFICANT CHANGE UP (ref 32–36)
MCV RBC AUTO: 88.2 FL — SIGNIFICANT CHANGE UP (ref 80–100)
PLATELET # BLD AUTO: 193 K/UL — SIGNIFICANT CHANGE UP (ref 150–400)
POTASSIUM SERPL-MCNC: 4.5 MMOL/L — SIGNIFICANT CHANGE UP (ref 3.5–5.3)
POTASSIUM SERPL-SCNC: 4.5 MMOL/L — SIGNIFICANT CHANGE UP (ref 3.5–5.3)
RBC # BLD: 5.02 M/UL — SIGNIFICANT CHANGE UP (ref 4.2–5.8)
RBC # FLD: 12.9 % — SIGNIFICANT CHANGE UP (ref 10.3–14.5)
SODIUM SERPL-SCNC: 139 MMOL/L — SIGNIFICANT CHANGE UP (ref 135–145)
WBC # BLD: 5.8 K/UL — SIGNIFICANT CHANGE UP (ref 3.8–10.5)
WBC # FLD AUTO: 5.8 K/UL — SIGNIFICANT CHANGE UP (ref 3.8–10.5)

## 2022-12-28 PROCEDURE — 93010 ELECTROCARDIOGRAM REPORT: CPT | Mod: 76

## 2022-12-28 PROCEDURE — 93320 DOPPLER ECHO COMPLETE: CPT

## 2022-12-28 PROCEDURE — 93005 ELECTROCARDIOGRAM TRACING: CPT

## 2022-12-28 PROCEDURE — 85027 COMPLETE CBC AUTOMATED: CPT

## 2022-12-28 PROCEDURE — 93325 DOPPLER ECHO COLOR FLOW MAPG: CPT

## 2022-12-28 PROCEDURE — 92960 CARDIOVERSION ELECTRIC EXT: CPT

## 2022-12-28 PROCEDURE — 80048 BASIC METABOLIC PNL TOTAL CA: CPT

## 2022-12-28 PROCEDURE — 93312 ECHO TRANSESOPHAGEAL: CPT

## 2022-12-28 PROCEDURE — 36415 COLL VENOUS BLD VENIPUNCTURE: CPT

## 2022-12-28 PROCEDURE — 93320 DOPPLER ECHO COMPLETE: CPT | Mod: 26

## 2022-12-28 PROCEDURE — 76376 3D RENDER W/INTRP POSTPROCES: CPT | Mod: 26

## 2022-12-28 PROCEDURE — 93312 ECHO TRANSESOPHAGEAL: CPT | Mod: 26

## 2022-12-28 PROCEDURE — 93325 DOPPLER ECHO COLOR FLOW MAPG: CPT | Mod: 26

## 2022-12-28 RX ORDER — METOPROLOL TARTRATE 50 MG
1 TABLET ORAL
Qty: 0 | Refills: 0 | DISCHARGE

## 2022-12-28 RX ORDER — AMIODARONE HYDROCHLORIDE 400 MG/1
400 TABLET ORAL EVERY 12 HOURS
Refills: 0 | Status: DISCONTINUED | OUTPATIENT
Start: 2022-12-28 | End: 2023-01-11

## 2022-12-28 RX ORDER — AMIODARONE HYDROCHLORIDE 400 MG/1
2 TABLET ORAL
Qty: 28 | Refills: 0
Start: 2022-12-28 | End: 2023-01-03

## 2022-12-28 NOTE — DISCHARGE NOTE PROVIDER - NSDCFUADDINST_GEN_ALL_CORE_FT
Follow up with Dr. Sawyer in 2 - 4 weeks. Our office will contact you in 3-5 days to schedule this appointment. Please call [phone] with questions or concerns.

## 2022-12-28 NOTE — DISCHARGE NOTE PROVIDER - NSDCFUSCHEDAPPT_GEN_ALL_CORE_FT
Tru Liz  [facility]  ELECTROPH [address]  Scheduled Appointment: 01/05/2023    Mike Kline  [facility]  INTMED [address] [facility] [address]  Scheduled Appointment: 01/09/2023    Gemini Norton  [facility]  CARDIOLOGY [address]  Scheduled Appointment: 02/21/2023

## 2022-12-28 NOTE — DISCHARGE NOTE PROVIDER - HOSPITAL COURSE
66 y/o M with PMHx significant for recently diagnosed Atrial fibrillation with likely tachycardia induced cardiomyopathy (EF 30-35% 12/7/22) , presented to [facility] today 12/28/22 for and is now s/p elective MUNIR with unsuccessful DCCV. MUNIR revealed no BARBARA thrombus with LVEF of 40-45%. Pt reports no complaints post procedure. Plan to start Amiodarone 400mg Q12HR x 7 days followed by 200mg QD, with anticipated short course of therapy. Pt has scheduled follow up with Dr. Liz on 1/5/23. The patient was observed per post procedure protocol, then discharged home with a plan for outpatient follow up. 68 y/o M with PMHx significant for recently diagnosed Atrial fibrillation with likely tachycardia induced cardiomyopathy (EF 30-35% 12/7/22) , presented to [facility] today 12/28/22 for and is now s/p elective MUNIR with unsuccessful DCCV. MUNIR revealed no BARBARA thrombus with LVEF of 40-45%. Pt reports no complaints post procedure. Plan to start Amiodarone 400mg Q12HR x 7 days followed by 200mg QD, with anticipated short course of therapy. Pt  The patient was observed per post procedure protocol, then discharged home with a plan for outpatient follow up.

## 2022-12-28 NOTE — DISCHARGE NOTE NURSING/CASE MANAGEMENT/SOCIAL WORK - PATIENT PORTAL LINK FT
You can access the FollowMyHealth Patient Portal offered by [facility] by registering at the following website: [email or web address]. By joining [employer]’s FollowMyHealth portal, you will also be able to view your health information using other applications (apps) compatible with our system.

## 2022-12-28 NOTE — H&P PST ADULT - HISTORY OF PRESENT ILLNESS
Pt is a 66 y/o male, PMHx significant for recently diagnosed Atrial fibrillation, presents to [facility] for elective MUNIR/DCCV with Dr. Norton. Pt was recently diagnosed with Afib on 11/23/22 after following up with his PCP for complaints of transient vision loss. Pt reports being unable to see a section of his clock for approximately 20 seconds before his vision returned to normal. Pt had an EKG performed which showed  Afib with RVR and pt was referred to Dr. Norton. Pt was initially placed on Diltiazem for rate control which was eventually changed to Metoprolol after pt was noted to have a decreased EF (30-35%) on TTE performed on 12/7/22. Pt has since had a cardiac CTA which revealed a calcium score of 208. Pt... Pt is a 66 y/o male, PMHx significant for recently diagnosed Atrial fibrillation, presents to [facility] for elective MUNIR/DCCV with Dr. Norton. Pt was recently diagnosed with Afib on 11/23/22 after following up with his PCP for complaints of transient vision loss. Pt reports being unable to see a section of his clock for approximately 20 seconds before his vision returned to normal. Pt had an EKG performed which showed  Afib with RVR and pt was referred to Dr. Norton. Pt was initially placed on Diltiazem for rate control which was eventually changed to Metoprolol after pt was noted to have a decreased EF (30-35%) on TTE performed on 12/7/22. Pt has since had a cardiac CTA which revealed a calcium score of 208. FFR was negative for significant stenosis of LAD and no plan for angiogram as per primary cardiology team. Pt reports strict compliance with Eliquis and states his last dose was this morning. COVID-19 PCR negative 12/23/22. Pt reports no complaints at time of arrival today. Currently denies chest pain, SOB, palpitations, fever, chills, dizziness, lethargy, recent syncopal events. Pt reports last PO intake (besides meds with sip of water) was > 8 hours PTA.    Cardiology Summary:  TTE 12/7/22: LVEF 30-35%  Cardiac CTA 12/9/22: calcium score of 208  FFR-CT 12/15/22: No hemodynamically significant stenosis.

## 2022-12-28 NOTE — DISCHARGE NOTE PROVIDER - NSDCMRMEDTOKEN_GEN_ALL_CORE_FT
atorvastatin 10 mg oral tablet: 1 tab(s) orally once a day  Eliquis 5 mg oral tablet: 1 tab(s) orally 2 times a day  Entresto 24 mg-26 mg oral tablet: 1 tab(s) orally 2 times a day  Low Dose ASA 81 mg oral tablet: 1 tab(s) orally once a day  metoprolol tartrate 100 mg oral tablet: 1 tab(s) orally once a day   amiodarone 200 mg oral tablet: 2 tab(s) orally every 12 hours x 7 days   amiodarone 200 mg oral tablet: 1 tab(s) orally once a day x 30 days. To start after completion of loading dose  atorvastatin 10 mg oral tablet: 1 tab(s) orally once a day  Eliquis 5 mg oral tablet: 1 tab(s) orally 2 times a day  Entresto 24 mg-26 mg oral tablet: 1 tab(s) orally 2 times a day  Low Dose ASA 81 mg oral tablet: 1 tab(s) orally once a day  Metoprolol Succinate  mg oral tablet, extended release: 1 tab(s) orally once a day

## 2022-12-28 NOTE — DISCHARGE NOTE NURSING/CASE MANAGEMENT/SOCIAL WORK - NSDPDISTO_GEN_ALL_CORE
Patient escaped from soft restraints and pulled NGT out. Spoke with Dr. Guillory. Stated that NGT can remain out if patient does not have nausea or vomiting. If patient has nausea or vomiting NGT needs to be replaced. Restraint replaced back on patient.  
Home

## 2022-12-28 NOTE — DISCHARGE NOTE PROVIDER - CARE PROVIDERS DIRECT ADDRESSES
[email or web address]

## 2022-12-28 NOTE — DISCHARGE NOTE PROVIDER - NSDCCPTREATMENT_GEN_ALL_CORE_FT
PRINCIPAL PROCEDURE  Procedure: Transesophageal echocardiography with cardioversion  Findings and Treatment: -Take each dose of your anticoagulation medication (blood thinner) exactly as prescribed.   -Resume your diet with soft foods first.  - Do not drive, operate heavy machinery or make important decisions for 24 hours following the procedure.  - You may resume all other activities the day after the procedure.  Call your doctor if:   -you develop a fever, cough up blood, have any chest pain, palpitations or difficulty breathing. You may take a throat lozenge if you develop a sore throat or try warm salt water gargle.   - you have any questions or concerns regarding the procedure.  If you experience increased difficulty breathing or chest pain, or if you faint, have dizzy spells, or for any other distressing symptom, please seek immediate medical attention.

## 2022-12-28 NOTE — PROGRESS NOTE ADULT - SUBJECTIVE AND OBJECTIVE BOX
Pt doing well s/p MUNIR and failed DCCV (300J x 1, 360J x 2). Denies any complaints post procedure.       Post procedure VS:  HR: 110  BP: 108/68  RR: 18  SpO2: 97%    Exam:   VSS, NAD, A&O x 3  Skin: no erythema/edema/blistering at defib pad sites.   Card: S1/S2, RRR, no m/g/r  Resp: lungs CTA b/l  Abd: S/NT/ND  Ext: no edema, distal pulses intact    EKG: Atrial fibrillation, ventricular rate 90 BPM    MUNIR: No BARBARA thrombus. LVEF 40-45%.     Assessment:   68 y/o M with PMHx significant for recently diagnosed Atrial fibrillation with likely tachycardia induced cardiomyopathy (EF 30-35% 12/7/22) , presented to [facility] today 12/28/22 for and is now s/p elective MUNIR with unsuccessful DCCV. MUNIR revealed no BARBARA thrombus with LVEF of 40-45%. Pt reports no complaints post procedure.      Plan:   Observation on telemetry per post op protocol.    Resume PO intake.   Ambulate w/ assist once fully awake & back to baseline mental status w/ VSS.  Continue Eliquis 5mg Q12HR. Importance of strict compliance with anticoagulation regimen reinforced with pt.   Resume other home medications.   Start Amiodarone 400mg Q12HR x 7 days, followed by 200mg QD  TFTs & LFTs should be monitored q 3-6 months while on amiodarone therapy. Anticipate <1 year of amiodarone therapy for this patient; however if > 1 year, patient will need annual fundoscopic exam and PFTs. Patient is advised of associated risks and need for monitoring; all questions answered to pt's expressed understanding.   Pt has a follow up appointment with Dr. Hobbs on 1/5/22, advised to keep this appointment  Anticipate d/c home once all criteria met, with outpt f/up as scheduled.       Will discuss with Dr. Sawyer, full recommendations to follow Pt doing well s/p MUNIR and failed DCCV (300J x 1, 360J x 2). Denies any complaints post procedure.       Post procedure VS:  HR: 110  BP: 108/68  RR: 18  SpO2: 97%    Exam:   VSS, NAD, A&O x 3  Skin: no erythema/edema/blistering at defib pad sites.   Card: S1/S2, RRR, no m/g/r  Resp: lungs CTA b/l  Abd: S/NT/ND  Ext: no edema, distal pulses intact    EKG: Atrial fibrillation, ventricular rate 90 BPM    MUNIR: No BARBARA thrombus. LVEF 40-45%.     Assessment:   68 y/o M with PMHx significant for recently diagnosed Atrial fibrillation with likely tachycardia induced cardiomyopathy (EF 30-35% 12/7/22) , presented to [facility] today 12/28/22 for and is now s/p elective MUNIR with unsuccessful DCCV. MUNIR revealed no BARBARA thrombus with LVEF of 40-45%. Pt reports no complaints post procedure.      Plan:   Observation on telemetry per post op protocol.    Resume PO intake.   Ambulate w/ assist once fully awake & back to baseline mental status w/ VSS.  Continue Eliquis 5mg Q12HR. Importance of strict compliance with anticoagulation regimen reinforced with pt.   Resume other home medications.   Start Amiodarone 400mg Q12HR x 7 days, followed by 200mg QD  TFTs & LFTs should be monitored q 3-6 months while on amiodarone therapy. Anticipate <1 year of amiodarone therapy for this patient; however if > 1 year, patient will need annual fundoscopic exam and PFTs. Patient is advised of associated risks and need for monitoring; all questions answered to pt's expressed understanding.   Anticipate d/c home once all criteria met, with outpt f/up with Dr. Sawyer as scheduled.       Will discuss with Dr. Sawyer, full recommendations to follow Pt doing well s/p MUNIR and failed DCCV (300J x 1, 360J x 2). Denies any complaints post procedure.       Post procedure VS:  HR: 110  BP: 108/68  RR: 18  SpO2: 97%    Exam:   VSS, NAD, A&O x 3  Skin: no erythema/edema/blistering at defib pad sites.   Card: S1/S2, irregular rate and rhythm, no m/g/r  Resp: lungs CTA b/l  Abd: S/NT/ND  Ext: no edema, distal pulses intact    EKG: Atrial fibrillation, ventricular rate 90 BPM    MUNIR: No BARBARA thrombus. LVEF 40-45%.     Assessment:   66 y/o M with PMHx significant for recently diagnosed Atrial fibrillation with likely tachycardia induced cardiomyopathy (EF 30-35% 12/7/22) , presented to [facility] today 12/28/22 for and is now s/p elective MUNIR with unsuccessful DCCV. MUNIR revealed no BARBARA thrombus with LVEF of 40-45%. Pt reports no complaints post procedure.      Plan:   Observation on telemetry per post op protocol.    Resume PO intake.   Ambulate w/ assist once fully awake & back to baseline mental status w/ VSS.  Continue Eliquis 5mg Q12HR. Importance of strict compliance with anticoagulation regimen reinforced with pt.   Resume other home medications.   Start Amiodarone 400mg Q12HR x 7 days, followed by 200mg QD  TFTs & LFTs should be monitored q 3-6 months while on amiodarone therapy. Anticipate <1 year of amiodarone therapy for this patient; however if > 1 year, patient will need annual fundoscopic exam and PFTs. Patient is advised of associated risks and need for monitoring; all questions answered to pt's expressed understanding.   GDMT: Resume Metoprolol and Entresto  Anticipate d/c home once all criteria met, with outpt f/up with Dr. Sawyer as scheduled.       Will discuss with Dr. Sawyer, full recommendations to follow

## 2022-12-28 NOTE — DISCHARGE NOTE PROVIDER - CARE PROVIDER_API CALL
Gemini Norton)  Cardiology; Internal Medicine  [address], [address]  [place], [place] [id number]  Phone: [phone]  Fax: [phone]  Follow Up Time:     Garett Sawyer)  Cardiac Electrophysiology; Cardiovascular Disease; Internal Medicine  [address] [place] [id number]  Phone: [phone]  Fax: [phone]  Follow Up Time:

## 2022-12-28 NOTE — ASU DISCHARGE PLAN (ADULT/PEDIATRIC) - NS MD DC FALL RISK RISK
For information on Fall & Injury Prevention, visit: [email or web address] OR  [email or web address] OR  https://www.cdc.gov/steadi/patient.html

## 2022-12-28 NOTE — PROGRESS NOTE ADULT - NS ATTEND AMEND GEN_ALL_CORE FT
Seen and examined during elective MUNIR-DCCV as requested by pt (to be seen by EP while here). 3 DCCVs failed. Counseled in length about amiodarone. Records/tests reviewed. Discussed with Dr. Norton. Will initiate short term amiodarone. Counseled about all BRA. Risks of amiodarone (most likely with long term but cannot guarantee no SEs with short term) include but not limited to neurological SE, vision damage, skin injury, thyroid or liver dysfunction, lung disease and interactions with other meds. Strongly recommended not to use it for more than 6 months and explained that I will not prescribe it or recommend it for longer than 6 months as AF rhythm control option. He has scheduled appt with Dr. Liz on 1/5/23 which I advised him to keep, but now he asked to see me instead since he met me here. Gave him an appointment on 1/9/23 at 3 pm.

## 2022-12-28 NOTE — H&P PST ADULT - ASSESSMENT
Pt is a 68 y/o male, PMHx significant for recently diagnosed Atrial fibrillation, presents to [facility] for elective MUNIR/DCCV with Dr. Norton. Pt was recently diagnosed with Afib on 11/23/22 after following up with his PCP for complaints of transient vision loss. Pt reports being unable to see a section of his clock for approximately 20 seconds before his vision returned to normal. Pt had an EKG performed which showed  Afib with RVR and pt was referred to Dr. Norton. Pt was initially placed on Diltiazem for rate control which was eventually changed to Metoprolol after pt was noted to have a decreased EF (30-35%) on TTE performed on 12/7/22. Pt has since had a cardiac CTA which revealed a calcium score of 208. FFR was negative for significant stenosis of LAD and no plan for angiogram as per primary cardiology team. Pt reports strict compliance with Eliquis and states his last dose was this morning. COVID-19 PCR negative 12/23/22. Pt reports no complaints at time of arrival today. Currently denies chest pain, SOB, palpitations, fever, chills, dizziness, lethargy, recent syncopal events. Pt reports last PO intake (besides meds with sip of water) was > 8 hours PTA.    Plan  1) MUNIR/DCCV  - Labs / EKG  - Consent with attending  - Maintain NPO status  - Full recommendations to follow depending on outcome of MUNIR/DCCV

## 2022-12-29 ENCOUNTER — NON-APPOINTMENT (OUTPATIENT)
Age: 67
End: 2022-12-29

## 2022-12-30 ENCOUNTER — NON-APPOINTMENT (OUTPATIENT)
Age: 67
End: 2022-12-30

## 2023-01-04 RX ORDER — AMIODARONE HYDROCHLORIDE 400 MG/1
1 TABLET ORAL
Qty: 30 | Refills: 0
Start: 2023-01-04 | End: 2023-02-02

## 2023-01-05 ENCOUNTER — APPOINTMENT (OUTPATIENT)
Dept: ELECTROPHYSIOLOGY | Facility: CLINIC | Age: 68
End: 2023-01-05

## 2023-01-05 PROBLEM — I10 ESSENTIAL (PRIMARY) HYPERTENSION: Chronic | Status: ACTIVE | Noted: 2022-12-28

## 2023-01-08 PROBLEM — Z13.220 SCREENING FOR CHOLESTEROL LEVEL: Status: ACTIVE | Noted: 2019-01-10

## 2023-01-08 PROBLEM — Z13.31 SCREENING FOR DEPRESSION: Status: ACTIVE | Noted: 2019-01-10

## 2023-01-09 ENCOUNTER — NON-APPOINTMENT (OUTPATIENT)
Age: 68
End: 2023-01-09

## 2023-01-09 ENCOUNTER — APPOINTMENT (OUTPATIENT)
Dept: INTERNAL MEDICINE | Facility: CLINIC | Age: 68
End: 2023-01-09
Payer: COMMERCIAL

## 2023-01-09 ENCOUNTER — APPOINTMENT (OUTPATIENT)
Dept: ELECTROPHYSIOLOGY | Facility: CLINIC | Age: 68
End: 2023-01-09

## 2023-01-09 VITALS
SYSTOLIC BLOOD PRESSURE: 140 MMHG | HEIGHT: 72 IN | BODY MASS INDEX: 24.52 KG/M2 | DIASTOLIC BLOOD PRESSURE: 70 MMHG | WEIGHT: 181 LBS

## 2023-01-09 DIAGNOSIS — Z13.31 ENCOUNTER FOR SCREENING FOR DEPRESSION: ICD-10-CM

## 2023-01-09 DIAGNOSIS — Z13.220 ENCOUNTER FOR SCREENING FOR LIPOID DISORDERS: ICD-10-CM

## 2023-01-09 PROCEDURE — 36415 COLL VENOUS BLD VENIPUNCTURE: CPT

## 2023-01-09 PROCEDURE — 93000 ELECTROCARDIOGRAM COMPLETE: CPT | Mod: 59

## 2023-01-09 PROCEDURE — G0444 DEPRESSION SCREEN ANNUAL: CPT | Mod: 59

## 2023-01-09 PROCEDURE — 99214 OFFICE O/P EST MOD 30 MIN: CPT | Mod: 25

## 2023-01-09 PROCEDURE — 99397 PER PM REEVAL EST PAT 65+ YR: CPT | Mod: 25

## 2023-01-09 NOTE — PHYSICAL EXAM
[No Acute Distress] : no acute distress [Well Nourished] : well nourished [Well Developed] : well developed [Well-Appearing] : well-appearing [Normal Sclera/Conjunctiva] : normal sclera/conjunctiva [PERRL] : pupils equal round and reactive to light [EOMI] : extraocular movements intact [Normal Outer Ear/Nose] : the outer ears and nose were normal in appearance [Normal Oropharynx] : the oropharynx was normal [No JVD] : no jugular venous distention [No Lymphadenopathy] : no lymphadenopathy [Supple] : supple [Thyroid Normal, No Nodules] : the thyroid was normal and there were no nodules present [No Respiratory Distress] : no respiratory distress  [No Accessory Muscle Use] : no accessory muscle use [Clear to Auscultation] : lungs were clear to auscultation bilaterally [Normal Rate] : normal rate  [Regular Rhythm] : with a regular rhythm [Normal S1, S2] : normal S1 and S2 [No Murmur] : no murmur heard [No Carotid Bruits] : no carotid bruits [No Abdominal Bruit] : a ~M bruit was not heard ~T in the abdomen [No Varicosities] : no varicosities [Pedal Pulses Present] : the pedal pulses are present [No Edema] : there was no peripheral edema [No Palpable Aorta] : no palpable aorta [No Extremity Clubbing/Cyanosis] : no extremity clubbing/cyanosis [Soft] : abdomen soft [Non Tender] : non-tender [Non-distended] : non-distended [No Masses] : no abdominal mass palpated [No HSM] : no HSM [Normal Bowel Sounds] : normal bowel sounds [Normal] : soft, non-tender, non-distended, no masses palpated, no HSM and normal bowel sounds [Normal Posterior Cervical Nodes] : no posterior cervical lymphadenopathy [Normal Anterior Cervical Nodes] : no anterior cervical lymphadenopathy [No CVA Tenderness] : no CVA  tenderness [No Spinal Tenderness] : no spinal tenderness [No Joint Swelling] : no joint swelling [Grossly Normal Strength/Tone] : grossly normal strength/tone [No Rash] : no rash [Coordination Grossly Intact] : coordination grossly intact [No Focal Deficits] : no focal deficits [Normal Gait] : normal gait [Deep Tendon Reflexes (DTR)] : deep tendon reflexes were 2+ and symmetric [Normal Affect] : the affect was normal [Normal Insight/Judgement] : insight and judgment were intact [de-identified] : Appeared to be in normal sinus rhythm during examination and EKG pending.  No evidence of A. fib with a heart rate of lowapproximately 60

## 2023-01-09 NOTE — ASSESSMENT
[FreeTextEntry1] : Wellness exam completed. All issues of health and screening up to date. Immunizations and screening reviewed with patient. All issues of health for the current year discussed in depth with patient. Patient was conversant during the exam and all pertinent issues addressed. Depression screen 0 in chart. Fall prevention was addressed.\par Unclear patient's insurance coverage and was told to go to the drugstore and get Prevnar 25 valent COVID-19 as well as Tdap.  Patient states he will do that\par Cholesterol levels discussed with patient. Compliance with oral medication were also addressed . Ideal LDL levels were  discussed with the patient. All issues regarding the implications of high cholesterol necessity for treatment were discussed with the patient who is conversant and all relevant questions were asked and answered.  Patient with coronary artery calcification is now on Lipitor and will have his labs checked within several months\par Patient had A. fib probably for prolonged period of time with a probably cardiomyopathy related to it.  Doing well now asymptomatic.  EKG performed today is normal sinus rhythm with heart rate below 50 metoprolol reduced to 50 mg EKG in chart and patient will follow-up with EPS\par All this was discussed with the patient at great length he will follow-up in several months\par All issues regarding patient's health and medical problems have been discussed. The patient understands and concurs with the treatment plan.\par \par EKG performed and wnl\par - sinus bradyca thererdia\par

## 2023-01-09 NOTE — HEALTH RISK ASSESSMENT
[No falls in past year] : Patient reported no falls in the past year [0] : 2) Feeling down, depressed, or hopeless: Not at all (0) [PHQ-2 Negative - No further assessment needed] : PHQ-2 Negative - No further assessment needed [Good] : ~his/her~  mood as  good [No] : No [Patient reported colonoscopy was normal] : Patient reported colonoscopy was normal [HIV test declined] : HIV test declined [Hepatitis C test declined] : Hepatitis C test declined [DHF1Rxriv] : 0 [ColonoscopyDate] : 02/27/2018

## 2023-01-09 NOTE — COUNSELING
[Fall prevention counseling provided] : Fall prevention counseling provided [FreeTextEntry1] : Discussed with patient that he should get a bracelet stating he is on anticoagulation with Eliquis

## 2023-01-09 NOTE — REVIEW OF SYSTEMS
[Palpitations] : palpitations [Negative] : Genitourinary [Chest Pain] : no chest pain [Claudication] : no  leg claudication [Lower Ext Edema] : no lower extremity edema [Orthopena] : no orthopnea [Shortness Of Breath] : no shortness of breath [Wheezing] : no wheezing [Dyspnea on Exertion] : not dyspnea on exertion [FreeTextEntry5] : cardiomyopathy

## 2023-01-09 NOTE — HISTORY OF PRESENT ILLNESS
[FreeTextEntry1] : wellness and Patient here in follow up to last visit and prior issue\par  [de-identified] : Patient's first visit in 3 years 67-year-old male who has no significant past medical history and is on no medications.  He came in for issues of concerns for mild memory loss with people's names.  He also had of interest an issue where he was unable to see 1 letter on the clock which resolved in 20 seconds.  He has no other neurological symptoms and is due to see an ophthalmologist\par \par [id number]\par wellness\par afib with marita cardiomyopathy\par Patient is here for wellness exam and discussion of medical problems.  At last visit November patient was in rapid atrial fibrillation.  He subsequently saw cardiology and was found to have a significantly low ejection fraction.  He had a cardioversion to normal rhythm apparently went back into A. fib and is seeing EPS.\par Patient not up-to-date with vaccines with tetanus pneumo and COVID that he was getting drugstore\par Patient denies any shortness of breath and is unaware of palpitations.  He is able to climb steps without difficulty.  He did have a coronary CAT scan where he has calcifications in his LAD and he is on Lipitor.  He otherwise offers no complaints

## 2023-01-10 ENCOUNTER — APPOINTMENT (OUTPATIENT)
Dept: CARDIOLOGY | Facility: CLINIC | Age: 68
End: 2023-01-10

## 2023-01-10 LAB
APPEARANCE: ABNORMAL
BACTERIA: NEGATIVE
BILIRUBIN URINE: ABNORMAL
BLOOD URINE: ABNORMAL
COLOR: YELLOW
GLUCOSE QUALITATIVE U: NEGATIVE
HYALINE CASTS: 1 /LPF
KETONES URINE: ABNORMAL
LEUKOCYTE ESTERASE URINE: ABNORMAL
MICROSCOPIC-UA: NORMAL
NITRITE URINE: NEGATIVE
PH URINE: 6
PROTEIN URINE: ABNORMAL
PSA SERPL-MCNC: 0.76 NG/ML
RED BLOOD CELLS URINE: 207 /HPF
SPECIFIC GRAVITY URINE: 1.03
SQUAMOUS EPITHELIAL CELLS: 0 /HPF
UROBILINOGEN URINE: ABNORMAL
WHITE BLOOD CELLS URINE: 28 /HPF

## 2023-01-11 ENCOUNTER — NON-APPOINTMENT (OUTPATIENT)
Age: 68
End: 2023-01-11

## 2023-01-15 LAB
APPEARANCE: CLEAR
BACTERIA UR CULT: NORMAL
BACTERIA: NEGATIVE
BILIRUBIN URINE: NEGATIVE
BLOOD URINE: ABNORMAL
COLOR: NORMAL
GLUCOSE QUALITATIVE U: NEGATIVE
HYALINE CASTS: 0 /LPF
KETONES URINE: NEGATIVE
LEUKOCYTE ESTERASE URINE: ABNORMAL
MICROSCOPIC-UA: NORMAL
NITRITE URINE: NEGATIVE
PH URINE: 6
PROTEIN URINE: NORMAL
RED BLOOD CELLS URINE: 5 /HPF
SPECIFIC GRAVITY URINE: 1.02
SQUAMOUS EPITHELIAL CELLS: 0 /HPF
UROBILINOGEN URINE: NORMAL
WHITE BLOOD CELLS URINE: 6 /HPF

## 2023-01-18 ENCOUNTER — NON-APPOINTMENT (OUTPATIENT)
Age: 68
End: 2023-01-18

## 2023-01-18 ENCOUNTER — RX CHANGE (OUTPATIENT)
Age: 68
End: 2023-01-18

## 2023-01-19 ENCOUNTER — APPOINTMENT (OUTPATIENT)
Dept: ELECTROPHYSIOLOGY | Facility: CLINIC | Age: 68
End: 2023-01-19
Payer: COMMERCIAL

## 2023-01-19 ENCOUNTER — NON-APPOINTMENT (OUTPATIENT)
Age: 68
End: 2023-01-19

## 2023-01-19 ENCOUNTER — RX CHANGE (OUTPATIENT)
Age: 68
End: 2023-01-19

## 2023-01-19 VITALS
TEMPERATURE: 98.9 F | OXYGEN SATURATION: 94 % | HEIGHT: 72 IN | DIASTOLIC BLOOD PRESSURE: 70 MMHG | WEIGHT: 188 LBS | HEART RATE: 57 BPM | BODY MASS INDEX: 25.47 KG/M2 | SYSTOLIC BLOOD PRESSURE: 118 MMHG

## 2023-01-19 PROCEDURE — 99214 OFFICE O/P EST MOD 30 MIN: CPT | Mod: 25

## 2023-01-19 PROCEDURE — 93000 ELECTROCARDIOGRAM COMPLETE: CPT

## 2023-01-19 RX ORDER — METOPROLOL SUCCINATE 50 MG/1
50 TABLET, EXTENDED RELEASE ORAL DAILY
Qty: 30 | Refills: 0 | Status: DISCONTINUED | COMMUNITY
Start: 2022-12-08 | End: 2023-01-19

## 2023-01-19 NOTE — HISTORY OF PRESENT ILLNESS
[FreeTextEntry1] : 68 year old gentleman presenting for evaluation of new onset persistent atrial fibrillation s/p DCCV, cardiomyopathy, and LBBB presenting for EP evaluation.  \par \par In 11/2022 he was found to be in AF during routine PMD evaluation. He had not previously been seen for a couple years, but ECG in 2/2020 was last known sinus rhythm. On ECG 11/23/22 he was in AF with LBBB at 116 bpm.   A TTE revealed LVEF 32%.   \par \par  He denied associated symptoms. He had been feeling well and had not been on any medication apart from vitamins.   \par \par A 2 week event monitor from 12/9 to 12/23/22 revealed persistent AF with average rate 97 bpm (range  bpm).  \par \par He does drink about 5 beers per week.   \par \par A couple months prior to the AF diagnosis he had sudden transient loss of vision. He has also been feeling forgetful.   \par \par A cardiac CT revealed nonsignificant LAD stenosis. \par \par He has also been on Eliquis and metoprolol, as well as GDMT for CHF with Entresto.  \par \par DCCV was performed 12/28/22, and amiodarone was started.  \par \par He had subsequent sinus bradycardia, and metoprolol was lowered to 50mg qd.  \par \par On evaluation today, he feels well. He denies any symptoms in setting of AF (including palpitation, dizziness, dyspnea, chest pain, or syncope), and noted no improvement in symptoms after DCCV.  \par \par ECG today reveals sinus rhythm 55 bpm with LBBB ( ms).  \par \par He was found to have an incidental UTI, but denies symptoms from this.  \par \par    \par \par Current meds include ASA 81, Eliquis 5mg bid, Toprol 50mg qd, Entresto, Farxiga, atorvastatin   Statement Selected

## 2023-01-19 NOTE — CARDIOLOGY SUMMARY
[de-identified] : 1/19/23 sinus rhythm 55 bpm, LBBB, first degree AVB  ms, +repolarization changes\par 11 23 2022 atrial fibrillation : LBBB. Hear rate 116 bopm.  [de-identified] : TTE 12/7/22 LVEF 32%, mildly enlarged LA, RV function is mildly reduced, mod-severe MR

## 2023-01-19 NOTE — DISCUSSION/SUMMARY
[FreeTextEntry1] :  68 year old gentleman presenting for evaluation of new onset persistent atrial fibrillation s/p DCCV, cardiomyopathy, and LBBB presenting for EP evaluation. He recently presented with persistent AF, and was found to have LBBB and cardiomyopathy with LVEF 30-35% in this setting, but no clear symptoms. He underwent DCCV, and has since remained in sinus rhythm on amiodarone. We discussed AF in detail, associated risks and morbidities; given his significant LV dysfunction in the setting of AF, I do think he warrants rhythm control, at least as an initial strategy. We discussed options including medical therapy, antiarrhythmic medications, and catheter ablation. The risks and benefits of AF ablation were discussed in detail; he is hopeful to avoid ablation, and strongly desires to try medical therapy for now. We did discuss risks of long-term toxicity in the setting of amiodarone, and that this is not an ideal long-term strategy; I did suggest sotalol as another antiarrhythmic option. I did also explain that he is likely to have recurrent AF in the future, particularly if antiarrhythmic meds are stopped. For now he prefers to continue on amiodarone, for a period of around 6 months or so if it remains tolerated. At that point would stop amiodarone, and either evaluate for recurrence, proceed with ablation, or try change to sotalol.  \par \par -will continue current meds including amiodarone for now. While on amiodarone needs to have LFTs, TFTs q 6mo and PFTs yearly \par \par -will repeat MCOT in 5 months, and EP follow-up after this \par \par -hold off on Farxiga given suspected UTI. However, given negative urine culture and lack of UTI symptoms, will also not start antibiotics, and follow-up with Dr Kline \par \par -Cardiology follow-up including repeat TTE planned with Dr Norton  [EKG obtained to assist in diagnosis and management of assessed problem(s)] : EKG obtained to assist in diagnosis and management of assessed problem(s)

## 2023-01-19 NOTE — REASON FOR VISIT
[Arrhythmia/ECG Abnorrmalities] : arrhythmia/ECG abnormalities [FreeTextEntry3] : Dr Norton, Dr Kline

## 2023-01-30 ENCOUNTER — APPOINTMENT (OUTPATIENT)
Dept: INTERNAL MEDICINE | Facility: CLINIC | Age: 68
End: 2023-01-30

## 2023-02-01 ENCOUNTER — APPOINTMENT (OUTPATIENT)
Dept: INTERNAL MEDICINE | Facility: CLINIC | Age: 68
End: 2023-02-01
Payer: COMMERCIAL

## 2023-02-01 VITALS
BODY MASS INDEX: 25.47 KG/M2 | DIASTOLIC BLOOD PRESSURE: 70 MMHG | SYSTOLIC BLOOD PRESSURE: 125 MMHG | WEIGHT: 188 LBS | HEIGHT: 72 IN

## 2023-02-01 PROCEDURE — 99214 OFFICE O/P EST MOD 30 MIN: CPT

## 2023-02-01 NOTE — HISTORY OF PRESENT ILLNESS
[FreeTextEntry1] : Patient here for evaluation of multiple medical problems and new issues to be discussed\par  [de-identified] : Patient's first visit in 3 years 67-year-old male who has no significant past medical history and is on no medications.  He came in for issues of concerns for mild memory loss with people's names.  He also had of interest an issue where he was unable to see 1 letter on the clock which resolved in 20 seconds.  He has no other neurological symptoms and is due to see an ophthalmologist\par \par [id number]\par wellness\par afib with marita cardiomyopathy\par Patient is here for wellness exam and discussion of medical problems.  At last visit November patient was in rapid atrial fibrillation.  He subsequently saw cardiology and was found to have a significantly low ejection fraction.  He had a cardioversion to normal rhythm apparently went back into A. fib and is seeing EPS.\par Patient not up-to-date with vaccines with tetanus pneumo and COVID that he was getting drugstore\par Patient denies any shortness of breath and is unaware of palpitations.  He is able to climb steps without difficulty.  He did have a coronary CAT scan where he has calcifications in his LAD and he is on Lipitor.  He otherwise offers no complaints\par \par [id number]\par Patient doing very well.  Completed course of Cipro without difficulty and will recheck urine in future.  Patient asymptomatic but not aware of whether or not he is in A. fib was in normal sinus rhythm last visit.  Overall feeling well\par

## 2023-02-01 NOTE — ASSESSMENT
[FreeTextEntry1] : Patient remains in normal sinus rhythm.  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  I discussed with him that most likely if he remains in normal sinus rhythm his ejection fraction will return to normal.  Patient is totally unaware\par Of being in atrial fibrillation and we discussed whether he should have an implantable cardiac monitor.\par Overall patient doing very well we will follow-up in 2 months we will keep tabs of his thyroid function test on amiodarone.  Patient is also aware that he cannot go on any medication for COVID due to interactions as well as being on Eliquis.  He would be a candidate for remdesivir if needed and will probably needed due to his low ejection fraction\par All issues regarding patient's health and medical problems have been discussed. The patient understands and concurs with the treatment plan.

## 2023-02-21 ENCOUNTER — APPOINTMENT (OUTPATIENT)
Dept: CARDIOLOGY | Facility: CLINIC | Age: 68
End: 2023-02-21
Payer: COMMERCIAL

## 2023-02-21 VITALS
HEIGHT: 72 IN | DIASTOLIC BLOOD PRESSURE: 70 MMHG | BODY MASS INDEX: 26.01 KG/M2 | WEIGHT: 192 LBS | SYSTOLIC BLOOD PRESSURE: 122 MMHG | HEART RATE: 48 BPM | TEMPERATURE: 97.9 F | OXYGEN SATURATION: 96 %

## 2023-02-21 PROCEDURE — 99215 OFFICE O/P EST HI 40 MIN: CPT

## 2023-02-21 RX ORDER — CIPROFLOXACIN HYDROCHLORIDE 500 MG/1
500 TABLET, FILM COATED ORAL
Qty: 28 | Refills: 0 | Status: DISCONTINUED | COMMUNITY
Start: 2023-01-15 | End: 2023-02-21

## 2023-02-21 RX ORDER — DAPAGLIFLOZIN 5 MG/1
5 TABLET, FILM COATED ORAL DAILY
Qty: 45 | Refills: 3 | Status: DISCONTINUED | COMMUNITY
Start: 2022-12-20 | End: 2023-02-21

## 2023-02-21 NOTE — HISTORY OF PRESENT ILLNESS
[FreeTextEntry1] : amaurosis fugax, mitral valve ergurgit,. and atrial fibrillation \par \par HPI for today: : he is feelign ok.  no chest pain . no dyspnea.  no leg edema. \par toelrating meds\par on anticoagulation . no bleedin g.  he is insinus.  \par \par \par old note: this is a 67 yea rol dmale with no significant medical history here with newly diagnosed afgib. \par he has not seen his docotr for quite someitime. does not take meds.  . he takes vitamins. \par he went for a routine physical cehck.  he wanted to get a covid vaccine. and also he has MR.  \par also noted that 7 weeks ago, he had an episode where her was looking a tht eclock and he had sudfden painless loss of vision that was teransient and resolved < 1 min.\par he is feeling forgetful .  recently. \par  \par he took his Bp in the monring and 8 : 30 and . hhis heart rate was 66  when he was at Dr. mejias office. he had rapid atrial fibrillation .  and was referred to a cardiologist\par he does not feel the palppuitations. no chest pain. no dyszpnea.   tirado snot feel papitaitons. \par \par \par No smoking. alcohol 5 beers a week.    no drugs \par \par Famiyl shitory\par Mother:  no myocardial infarction . no cerebrovascular accident \par father:  no myocardial infarction . no cerebrovascular accident

## 2023-02-21 NOTE — CARDIOLOGY SUMMARY
[de-identified] : 1 2023:  sinu aureliano LBBB\par \par \par 11 23 2022 atrial fibrillation : LBBB. Hear rate 116 bopm.  [de-identified] : 28 dec 2022:   LVEF 30-35%.  normal RV.  mild TR. moderately enlarged LA  [de-identified] : cardiac cta:  dec 2022:     Moderate CAC .  75th percentile. LAD 30-40%.  LVEF 35%.  \par Negative FFR.

## 2023-02-21 NOTE — DISCUSSION/SUMMARY
[Patient] : the patient [Risks] : risks [Benefits] : benefits [Alternatives] : alternatives [With Me] : with me [___ Month(s)] : in [unfilled] month(s) [FreeTextEntry1] : this is a 67 year male with no significant medical history here with newly diagnosed afib.\par \par 1) atrial fibrillation :   lone.  ? possible TIA.  CHADSVAASC 4 (CVA , HTN and >?65) eliqwuis 5 mg Q12.   \par s/p   cardioversion.   beta blocker amiodarone 200 mg daily.  \par TSH check \par  2) congestive heart failure . HFref:  beta blocker entresto,   repeat echo end of march. i am expecting Ef would recover . farxiga was not approved at first.  if ef does not recover, then will add farxiga and aldaonte. patient would like not to add meds if the Ef recovers since most likely diagnosis was tachy idnuiced CMP and he is maintiang sinus. \par 3) sinus aureliano: recently reduced beta blocker . now on toproll 50 \par 4)  coronary artery disease : statisn 10 mg. aspirin 81 \par

## 2023-03-17 ENCOUNTER — NON-APPOINTMENT (OUTPATIENT)
Age: 68
End: 2023-03-17

## 2023-04-04 ENCOUNTER — APPOINTMENT (OUTPATIENT)
Dept: CARDIOLOGY | Facility: CLINIC | Age: 68
End: 2023-04-04
Payer: COMMERCIAL

## 2023-04-04 PROCEDURE — 93306 TTE W/DOPPLER COMPLETE: CPT

## 2023-04-07 ENCOUNTER — NON-APPOINTMENT (OUTPATIENT)
Age: 68
End: 2023-04-07

## 2023-04-12 ENCOUNTER — APPOINTMENT (OUTPATIENT)
Dept: INTERNAL MEDICINE | Facility: CLINIC | Age: 68
End: 2023-04-12
Payer: COMMERCIAL

## 2023-04-12 ENCOUNTER — NON-APPOINTMENT (OUTPATIENT)
Age: 68
End: 2023-04-12

## 2023-04-12 ENCOUNTER — LABORATORY RESULT (OUTPATIENT)
Age: 68
End: 2023-04-12

## 2023-04-12 VITALS
WEIGHT: 188 LBS | DIASTOLIC BLOOD PRESSURE: 60 MMHG | SYSTOLIC BLOOD PRESSURE: 122 MMHG | HEIGHT: 72 IN | BODY MASS INDEX: 25.47 KG/M2

## 2023-04-12 DIAGNOSIS — R31.9 HEMATURIA, UNSPECIFIED: ICD-10-CM

## 2023-04-12 PROCEDURE — 93000 ELECTROCARDIOGRAM COMPLETE: CPT

## 2023-04-12 PROCEDURE — 99215 OFFICE O/P EST HI 40 MIN: CPT | Mod: 25

## 2023-04-12 NOTE — HISTORY OF PRESENT ILLNESS
[FreeTextEntry1] : Patient here for evaluation of multiple medical problems and new issues to be discussed\par  [de-identified] : Patient's first visit in 3 years 67-year-old male who has no significant past medical history and is on no medications.  He came in for issues of concerns for mild memory loss with people's names.  He also had of interest an issue where he was unable to see 1 letter on the clock which resolved in 20 seconds.  He has no other neurological symptoms and is due to see an ophthalmologist\par \par [id number]\par wellness\par afib with marita cardiomyopathy\par Patient is here for wellness exam and discussion of medical problems.  At last visit November patient was in rapid atrial fibrillation.  He subsequently saw cardiology and was found to have a significantly low ejection fraction.  He had a cardioversion to normal rhythm apparently went back into A. fib and is seeing EPS.\par Patient not up-to-date with vaccines with tetanus pneumo and COVID that he was getting drugstore\par Patient denies any shortness of breath and is unaware of palpitations.  He is able to climb steps without difficulty.  He did have a coronary CAT scan where he has calcifications in his LAD and he is on Lipitor.  He otherwise offers no complaints\par \par [id number]\par Patient doing very well.  Completed course of Cipro without difficulty and will recheck urine in future.  Patient asymptomatic but not aware of whether or not he is in A. fib was in normal sinus rhythm last visit.  Overall feeling well\par \par \par [id number]\par Patient returns for routine visit.  He states he feels significantly better.  He recently saw cardiology and was in normal sinus rhythm and had elective visit to see Dr. Liz.  He does complain of intermittent hematuria without pain and is on Eliquis with normal PSA.  He has no urinary tract symptoms of infection

## 2023-04-12 NOTE — PHYSICAL EXAM
[No Acute Distress] : no acute distress [Well Nourished] : well nourished [Well Developed] : well developed [Well-Appearing] : well-appearing [Normal Sclera/Conjunctiva] : normal sclera/conjunctiva [PERRL] : pupils equal round and reactive to light [EOMI] : extraocular movements intact [Normal Outer Ear/Nose] : the outer ears and nose were normal in appearance [Normal Oropharynx] : the oropharynx was normal [No JVD] : no jugular venous distention [No Lymphadenopathy] : no lymphadenopathy [Supple] : supple [Thyroid Normal, No Nodules] : the thyroid was normal and there were no nodules present [No Respiratory Distress] : no respiratory distress  [No Accessory Muscle Use] : no accessory muscle use [Clear to Auscultation] : lungs were clear to auscultation bilaterally [No Murmur] : no murmur heard [No Carotid Bruits] : no carotid bruits [No Abdominal Bruit] : a ~M bruit was not heard ~T in the abdomen [No Varicosities] : no varicosities [Pedal Pulses Present] : the pedal pulses are present [No Edema] : there was no peripheral edema [No Palpable Aorta] : no palpable aorta [No Extremity Clubbing/Cyanosis] : no extremity clubbing/cyanosis [Soft] : abdomen soft [Non Tender] : non-tender [Non-distended] : non-distended [No Masses] : no abdominal mass palpated [No HSM] : no HSM [Normal Bowel Sounds] : normal bowel sounds [Normal Posterior Cervical Nodes] : no posterior cervical lymphadenopathy [Normal Anterior Cervical Nodes] : no anterior cervical lymphadenopathy [No CVA Tenderness] : no CVA  tenderness [No Spinal Tenderness] : no spinal tenderness [No Joint Swelling] : no joint swelling [Grossly Normal Strength/Tone] : grossly normal strength/tone [No Rash] : no rash [Coordination Grossly Intact] : coordination grossly intact [No Focal Deficits] : no focal deficits [Normal Gait] : normal gait [Deep Tendon Reflexes (DTR)] : deep tendon reflexes were 2+ and symmetric [Normal Affect] : the affect was normal [Normal Insight/Judgement] : insight and judgment were intact [de-identified] : Irregular heart rate of concern for recurrent A-fib

## 2023-04-12 NOTE — ASSESSMENT
[FreeTextEntry1] : An EKG performed reveals patient is back in A-fib flutter with an appropriate heart rate and his chronic left bundle branch block.  Cardiology spoken to and will see him.  Message left with Dr. Liz briefly discussed with patient that he will probably need to have an ablation\par \par Of concern patient with gross hematuria patient referred to urology and culture done.  Will not stop Eliquis due to patient being in A-fib now\par \par If patient needs ablation I will defer to EPS whether he should have a Watchman procedure if hematuria becomes an issue\par \par Patient examined and adjustments to therapy for HBP not needed all labs reviewed with patient as well. Review of systems and physical exam discussed with patient. Care plan for future followups discussed with patient. All issues of health for the current year discussed in depth with patient who was conversant and all relevant issues addressed.\par Patient also with elevated TSH on amiodarone which clearly is not doing anything now and thyroid tests repeated\par Extended visit.  Insert care summary\par All of these issues discussed with patient who will be following up with urology and cardiology

## 2023-04-13 LAB
ALBUMIN SERPL ELPH-MCNC: 5.1 G/DL
ALP BLD-CCNC: 78 U/L
ALT SERPL-CCNC: 22 U/L
ANION GAP SERPL CALC-SCNC: 12 MMOL/L
AST SERPL-CCNC: 21 U/L
BILIRUB SERPL-MCNC: 0.5 MG/DL
BUN SERPL-MCNC: 28 MG/DL
CALCIUM SERPL-MCNC: 9.8 MG/DL
CHLORIDE SERPL-SCNC: 102 MMOL/L
CHOLEST SERPL-MCNC: 106 MG/DL
CO2 SERPL-SCNC: 26 MMOL/L
CREAT SERPL-MCNC: 1.02 MG/DL
EGFR: 81 ML/MIN/1.73M2
GLUCOSE SERPL-MCNC: 119 MG/DL
HDLC SERPL-MCNC: 46 MG/DL
LDLC SERPL CALC-MCNC: 37 MG/DL
NONHDLC SERPL-MCNC: 60 MG/DL
POTASSIUM SERPL-SCNC: 4.7 MMOL/L
PROT SERPL-MCNC: 6.9 G/DL
SODIUM SERPL-SCNC: 140 MMOL/L
T4 FREE SERPL-MCNC: 1 NG/DL
TRIGL SERPL-MCNC: 118 MG/DL
TSH SERPL-ACNC: 8.2 UIU/ML

## 2023-04-14 ENCOUNTER — NON-APPOINTMENT (OUTPATIENT)
Age: 68
End: 2023-04-14

## 2023-04-14 LAB
APPEARANCE: ABNORMAL
BACTERIA UR CULT: NORMAL
BILIRUBIN URINE: NEGATIVE
BLOOD URINE: ABNORMAL
COLOR: ABNORMAL
GLUCOSE QUALITATIVE U: NEGATIVE
KETONES URINE: NORMAL
LEUKOCYTE ESTERASE URINE: ABNORMAL
NITRITE URINE: NEGATIVE
PH URINE: 7
PROTEIN URINE: ABNORMAL
SPECIFIC GRAVITY URINE: 1.02
UROBILINOGEN URINE: NORMAL

## 2023-04-19 ENCOUNTER — NON-APPOINTMENT (OUTPATIENT)
Age: 68
End: 2023-04-19

## 2023-04-20 ENCOUNTER — APPOINTMENT (OUTPATIENT)
Dept: ELECTROPHYSIOLOGY | Facility: CLINIC | Age: 68
End: 2023-04-20
Payer: COMMERCIAL

## 2023-04-20 PROCEDURE — 93000 ELECTROCARDIOGRAM COMPLETE: CPT

## 2023-04-20 PROCEDURE — 99214 OFFICE O/P EST MOD 30 MIN: CPT | Mod: 25

## 2023-04-20 RX ORDER — AMIODARONE HYDROCHLORIDE 200 MG/1
200 TABLET ORAL
Qty: 90 | Refills: 0 | Status: DISCONTINUED | COMMUNITY
Start: 2023-01-05 | End: 2023-04-20

## 2023-04-20 NOTE — DISCUSSION/SUMMARY
[EKG obtained to assist in diagnosis and management of assessed problem(s)] : EKG obtained to assist in diagnosis and management of assessed problem(s) [FreeTextEntry1] : 68 year old gentleman presenting for follow up and management of persistent atrial fibrillation s/p DCCV, cardiomyopathy, and LBBB.\par \par To summarize his history In 11/2022 he was found to be in AF during routine PMD evaluation. He had not previously been seen for a couple years, but ECG in 2/2020 was last known sinus rhythm. On ECG 11/23/22 he was in AF with LBBB at 116 bpm. A TTE revealed LVEF 32%. \par \par He denied associated symptoms. A 2 week event monitor from 12/9 to 12/23/22 revealed persistent AF with average rate 97 bpm (range  bpm). \par \par A couple months prior to the AF diagnosis he had sudden transient loss of vision. A cardiac CT revealed nonsignificant LAD stenosis. DCCV was performed 12/28/22, and amiodarone was initiated. He had subsequent sinus bradycardia, and metoprolol was lowered to 50mg qd. \par \par During evaluation with Dr. Liz 1/19/23 he was maintaining SR and reported no change in symptoms in SR.He expressed desire to avoid ablationa nd continue amiodarone at that time. \par \par During subsequent f/u with Dr. Kline 4/12/23 recurrent AF noted. \par \par Today, he reports he is unaware of symptoms when in AF. ECG reveals SR with LBBB. Recent elevated TSH= 8.2 on 4/12/23. \par \par Recommendation: \par \par Mr. Grullon has been feeling well since last follow up but recently noted to have recurrent AF on amiodarone therapy and elevated TSH of 8.2. We discussed the potential toxicities of amiodarone use and the recommendation to avoid use in the setting of thyroid abnormality. Rhythm control options discussed at length including risks/benefits of alternative AAT agents including Multaq or Sotalol. He wishes to avoid hospitalization for sotalol initiation and would like to try Multaq prior to moving forward with ablation. Risks/benefits of ablation discussed and he is willing to consider after alternative AAT attempted. To stop amiodarone, start Multaq 400mg BID with meals. (requesting mail away Rx to be sent home). Once Multaq received will arrange 1 week external monitor for arrhythmia surveillance approximately 6 weeks after initiation. He would like to start planning AF ablation for June 2023 as he understands if AF reoccurs there is risk for HF progression. To arrange. To hold Eliquis the morning of the procedure.\par \par Sophia Moon ANP-C

## 2023-04-20 NOTE — HISTORY OF PRESENT ILLNESS
[FreeTextEntry1] : 68 year old gentleman presenting for follow up and management of persistent atrial fibrillation s/p DCCV, cardiomyopathy, and LBBB.\par \par To summarize his history In 11/2022 he was found to be in AF during routine PMD evaluation. He had not previously been seen for a couple years, but ECG in 2/2020 was last known sinus rhythm. On ECG 11/23/22 he was in AF with LBBB at 116 bpm. A TTE revealed LVEF 32%. \par \par He denied associated symptoms. A 2 week event monitor from 12/9 to 12/23/22 revealed persistent AF with average rate 97 bpm (range  bpm). \par \par A couple months prior to the AF diagnosis he had sudden transient loss of vision. A cardiac CT revealed nonsignificant LAD stenosis. DCCV was performed 12/28/22, and amiodarone was initiated. He had subsequent sinus bradycardia, and metoprolol was lowered to 50mg qd. \par \par During evaluation with Dr. Liz 1/19/23 he was maintaining SR and reported no change in symptoms in SR.He expressed desire to avoid ablationa nd continue amiodarone at that time. \par \par During subsequent f/u with Dr. Kline 4/12/23 recurrent AF noted. \par \par Today, he reports he is unaware of symptoms when in AF. ECG reveals SR with LBBB. Recent elevated TSH= 8.2 on 4/12/23. \par \par

## 2023-04-20 NOTE — PHYSICAL EXAM
[Well Developed] : well developed [Normal Gait] : normal gait [No Edema] : no edema [Moves all extremities] : moves all extremities [Alert and Oriented] : alert and oriented

## 2023-04-20 NOTE — ADDENDUM
[FreeTextEntry1] : I was present for the above evaluation and agree with the history, physical exam, assessment and plan as above. He is in sinus rhythm/bradycardia today, and remained on amiodarone. He has also had evidence of improving LV function (now LVEF 47%) with sinus rhythm. However, he has had new evidence of thyroid dysfunction likely related to amiodarone. We had an extensive discussion of management options, including continuing amiodarone and concomitant management of thyroid abnormalities, alternative medical therapy, catheter ablation and PPM implant. While he does continue to prefer to avoid invasive procedures, he has limited good options for antiarrhythmic medication at this time; sotalol would require inpt admission and he does not want to do that, and multaq is unlikely to be effective in the long-term and should be avoid in CHF. \par We did discuss the risks and benefits of AF ablation in detail, including procedure related risks such as bleeding, vascular injury, cardiac perforation, stroke and esophageal injury. He expressed understanding, and is now more amenable to proceeding with ablation, particularly if medical therapy does fail. Will trial transition to Multaq, with close followup. If he has progressive AF (which is likely to re-exacerbate his cardiomyopathy) he would then be agreeable to ablation.\par \par At time of ablation, will do EP study and evaluate HV interval. if he does develop progressive CMP with LBBB despite rhythm control, a CRT device would be considered, though he is not agreeable to this at this time (and currently not indicated).

## 2023-04-21 ENCOUNTER — NON-APPOINTMENT (OUTPATIENT)
Age: 68
End: 2023-04-21

## 2023-04-24 ENCOUNTER — APPOINTMENT (OUTPATIENT)
Dept: UROLOGY | Facility: CLINIC | Age: 68
End: 2023-04-24
Payer: COMMERCIAL

## 2023-04-24 VITALS — SYSTOLIC BLOOD PRESSURE: 159 MMHG | DIASTOLIC BLOOD PRESSURE: 78 MMHG | HEART RATE: 58 BPM

## 2023-04-24 PROCEDURE — 88112 CYTOPATH CELL ENHANCE TECH: CPT | Mod: 26

## 2023-04-24 PROCEDURE — 99204 OFFICE O/P NEW MOD 45 MIN: CPT

## 2023-04-25 LAB — URINE CYTOLOGY: NORMAL

## 2023-04-25 NOTE — PHYSICAL EXAM
[General Appearance - Well Developed] : well developed [General Appearance - Well Nourished] : well nourished [Normal Appearance] : normal appearance [Well Groomed] : well groomed [General Appearance - In No Acute Distress] : no acute distress [Edema] : no peripheral edema [] : no respiratory distress [Respiration, Rhythm And Depth] : normal respiratory rhythm and effort [Exaggerated Use Of Accessory Muscles For Inspiration] : no accessory muscle use [Urinary Bladder Findings] : the bladder was normal on palpation [Urethral Meatus] : meatus normal [Scrotum] : the scrotum was normal [Epididymis] : the epididymides were normal [Rectal Exam - Seminal Vesicles] : the seminal vesicles were normal [Testes Tenderness] : no tenderness of the testes [Testes Mass (___cm)] : there were no testicular masses [Rectal Exam - Rectum] : digital rectal exam was normal [No Prostate Nodules] : no prostate nodules [Normal Station and Gait] : the gait and station were normal for the patient's age [Oriented To Time, Place, And Person] : oriented to person, place, and time [Affect] : the affect was normal [Mood] : the mood was normal [Not Anxious] : not anxious [Inguinal Lymph Nodes Enlarged Bilaterally] : inguinal

## 2023-04-25 NOTE — ASSESSMENT
[FreeTextEntry1] : \par plan:\par - urine culture\par - urine cytology\par - CTU\par - office cystoscopy

## 2023-04-25 NOTE — HISTORY OF PRESENT ILLNESS
[FreeTextEntry1] : 69 yo M for initial consultation due to gross hematuria\par recently diagnosed with cardiomyopathy, CAD and A fib\par on eliquis and regular cardiology follow up\par no PSH\par NKDA\par not a smoker\par not FH of prostate cancer\par last PSA 01/2023 - 0.76\par \par no previous urologic history\par about 8 weeks ago had gross hematuria\par no pain, no fever\par \par exam today unremarkable\par RUBIA smooth prostate\par \par plan:\par - urine culture\par - urine cytology\par - CTU\par - office cystoscopy

## 2023-04-27 LAB — BACTERIA UR CULT: NORMAL

## 2023-05-05 ENCOUNTER — RX CHANGE (OUTPATIENT)
Age: 68
End: 2023-05-05

## 2023-05-09 ENCOUNTER — OUTPATIENT (OUTPATIENT)
Dept: OUTPATIENT SERVICES | Facility: HOSPITAL | Age: 68
LOS: 1 days | End: 2023-05-09
Payer: COMMERCIAL

## 2023-05-09 ENCOUNTER — APPOINTMENT (OUTPATIENT)
Dept: CT IMAGING | Facility: CLINIC | Age: 68
End: 2023-05-09

## 2023-05-09 DIAGNOSIS — R31.0 GROSS HEMATURIA: ICD-10-CM

## 2023-05-09 PROCEDURE — 74178 CT ABD&PLV WO CNTR FLWD CNTR: CPT | Mod: 26

## 2023-05-10 ENCOUNTER — RX CHANGE (OUTPATIENT)
Age: 68
End: 2023-05-10

## 2023-05-15 ENCOUNTER — APPOINTMENT (OUTPATIENT)
Dept: UROLOGY | Facility: CLINIC | Age: 68
End: 2023-05-15

## 2023-05-17 ENCOUNTER — APPOINTMENT (OUTPATIENT)
Dept: CARDIOLOGY | Facility: CLINIC | Age: 68
End: 2023-05-17
Payer: COMMERCIAL

## 2023-05-17 VITALS
DIASTOLIC BLOOD PRESSURE: 69 MMHG | SYSTOLIC BLOOD PRESSURE: 121 MMHG | TEMPERATURE: 98.2 F | HEIGHT: 72 IN | BODY MASS INDEX: 25.6 KG/M2 | WEIGHT: 189 LBS | OXYGEN SATURATION: 96 % | HEART RATE: 47 BPM

## 2023-05-17 PROCEDURE — 99215 OFFICE O/P EST HI 40 MIN: CPT | Mod: 25

## 2023-05-17 PROCEDURE — 93000 ELECTROCARDIOGRAM COMPLETE: CPT

## 2023-05-17 RX ORDER — METOPROLOL SUCCINATE 50 MG/1
50 TABLET, EXTENDED RELEASE ORAL
Qty: 90 | Refills: 3 | Status: DISCONTINUED | COMMUNITY
Start: 2023-01-19 | End: 2023-05-17

## 2023-05-18 ENCOUNTER — NON-APPOINTMENT (OUTPATIENT)
Age: 68
End: 2023-05-18

## 2023-05-22 ENCOUNTER — APPOINTMENT (OUTPATIENT)
Dept: UROLOGY | Facility: CLINIC | Age: 68
End: 2023-05-22
Payer: COMMERCIAL

## 2023-05-22 DIAGNOSIS — R31.0 GROSS HEMATURIA: ICD-10-CM

## 2023-05-22 LAB
BILIRUB UR QL STRIP: NORMAL
CLARITY UR: CLEAR
COLLECTION METHOD: NORMAL
GLUCOSE UR-MCNC: NORMAL
HCG UR QL: 0.2 EU/DL
HGB UR QL STRIP.AUTO: ABNORMAL
KETONES UR-MCNC: NORMAL
LEUKOCYTE ESTERASE UR QL STRIP: ABNORMAL
NITRITE UR QL STRIP: NORMAL
PH UR STRIP: 5.5
PROT UR STRIP-MCNC: NORMAL
SP GR UR STRIP: <=1.005

## 2023-05-22 PROCEDURE — 81003 URINALYSIS AUTO W/O SCOPE: CPT | Mod: NC,QW

## 2023-05-22 PROCEDURE — 52000 CYSTOURETHROSCOPY: CPT

## 2023-05-22 PROCEDURE — 99215 OFFICE O/P EST HI 40 MIN: CPT | Mod: 25

## 2023-05-23 PROBLEM — R31.0 GROSS HEMATURIA: Status: ACTIVE | Noted: 2023-04-24

## 2023-05-23 NOTE — HISTORY OF PRESENT ILLNESS
[FreeTextEntry1] : 69 yo M for follow up\par see previous note\par on eliquis and aspirin\par \par PSA 0.76\par reviewed cytology negative\par reviewed urine culture negative\par cystoscopy today: BPH only\par \par reviewed CT with patient: large bilateral UPJ stones, with small stones in the kidneys and moderate hydronephrosis, HU ~ 1100\par \par discussed obstruction\par discussed the risk of decrease in renal function\par ESWL has best success with smaller, less dense stones, with short skin to stone distance, and with favorable location of the stone within the system.\par URS is more a successful treatment with multiple stones, more dense stones, challenging body habitus, or stone location.\par PCNL is best for larger, more dense and complex stones, particularly those involving the lower pole.\par Risks of nontreatment with obstruction can lead to very high rate of renal function loss in stone-bearing kidney over next months to years.\par \par In this patient's case, I recommend staged bilateral PCNL\par Procedure, in hospital stay and recovery discussed with the patient. Potential complications of bleeding, transfusion, selective angioembolization if indicated, adjacent organ injury, fever, sepsis, infection, incomplete stone clearance, bowel or other unusual injury. chest complications, vascular injuries, procedures needed to address any complications, ureteral injury, anesthetic complications, all discussed.\par \par plan:\par - left mini-PCNL\par - right mini-PCNL\par - will schedule for two separate days

## 2023-05-24 ENCOUNTER — APPOINTMENT (OUTPATIENT)
Dept: FAMILY MEDICINE | Facility: CLINIC | Age: 68
End: 2023-05-24
Payer: COMMERCIAL

## 2023-05-24 VITALS
WEIGHT: 189 LBS | RESPIRATION RATE: 16 BRPM | BODY MASS INDEX: 25.05 KG/M2 | DIASTOLIC BLOOD PRESSURE: 62 MMHG | SYSTOLIC BLOOD PRESSURE: 122 MMHG | HEIGHT: 73 IN | HEART RATE: 56 BPM

## 2023-05-24 DIAGNOSIS — Z01.818 ENCOUNTER FOR OTHER PREPROCEDURAL EXAMINATION: ICD-10-CM

## 2023-05-24 PROCEDURE — 99214 OFFICE O/P EST MOD 30 MIN: CPT

## 2023-05-25 ENCOUNTER — APPOINTMENT (OUTPATIENT)
Dept: NEPHROLOGY | Facility: CLINIC | Age: 68
End: 2023-05-25

## 2023-05-25 ENCOUNTER — APPOINTMENT (OUTPATIENT)
Dept: NEPHROLOGY | Facility: CLINIC | Age: 68
End: 2023-05-25
Payer: COMMERCIAL

## 2023-05-25 ENCOUNTER — OUTPATIENT (OUTPATIENT)
Dept: OUTPATIENT SERVICES | Facility: HOSPITAL | Age: 68
LOS: 1 days | End: 2023-05-25
Payer: COMMERCIAL

## 2023-05-25 VITALS
WEIGHT: 193 LBS | HEIGHT: 72 IN | OXYGEN SATURATION: 97 % | HEART RATE: 56 BPM | DIASTOLIC BLOOD PRESSURE: 73 MMHG | SYSTOLIC BLOOD PRESSURE: 137 MMHG | BODY MASS INDEX: 26.14 KG/M2

## 2023-05-25 VITALS
OXYGEN SATURATION: 98 % | RESPIRATION RATE: 18 BRPM | TEMPERATURE: 97 F | HEART RATE: 52 BPM | WEIGHT: 187.83 LBS | HEIGHT: 72 IN | SYSTOLIC BLOOD PRESSURE: 110 MMHG | DIASTOLIC BLOOD PRESSURE: 72 MMHG

## 2023-05-25 DIAGNOSIS — I10 ESSENTIAL (PRIMARY) HYPERTENSION: ICD-10-CM

## 2023-05-25 DIAGNOSIS — N20.0 CALCULUS OF KIDNEY: ICD-10-CM

## 2023-05-25 DIAGNOSIS — Z29.9 ENCOUNTER FOR PROPHYLACTIC MEASURES, UNSPECIFIED: ICD-10-CM

## 2023-05-25 DIAGNOSIS — Z01.818 ENCOUNTER FOR OTHER PREPROCEDURAL EXAMINATION: ICD-10-CM

## 2023-05-25 DIAGNOSIS — Z98.890 OTHER SPECIFIED POSTPROCEDURAL STATES: Chronic | ICD-10-CM

## 2023-05-25 DIAGNOSIS — I48.91 UNSPECIFIED ATRIAL FIBRILLATION: ICD-10-CM

## 2023-05-25 LAB
A1C WITH ESTIMATED AVERAGE GLUCOSE RESULT: 5.4 % — SIGNIFICANT CHANGE UP (ref 4–5.6)
ANION GAP SERPL CALC-SCNC: 12 MMOL/L — SIGNIFICANT CHANGE UP (ref 5–17)
APPEARANCE UR: CLEAR — SIGNIFICANT CHANGE UP
APTT BLD: 39.5 SEC — HIGH (ref 27.5–35.5)
BASOPHILS # BLD AUTO: 0.06 K/UL — SIGNIFICANT CHANGE UP (ref 0–0.2)
BASOPHILS NFR BLD AUTO: 1.1 % — SIGNIFICANT CHANGE UP (ref 0–2)
BILIRUB UR-MCNC: NEGATIVE — SIGNIFICANT CHANGE UP
BUN SERPL-MCNC: 14 MG/DL — SIGNIFICANT CHANGE UP (ref 8–20)
CALCIUM SERPL-MCNC: 9.7 MG/DL — SIGNIFICANT CHANGE UP (ref 8.4–10.5)
CHLORIDE SERPL-SCNC: 97 MMOL/L — SIGNIFICANT CHANGE UP (ref 96–108)
CO2 SERPL-SCNC: 27 MMOL/L — SIGNIFICANT CHANGE UP (ref 22–29)
COLOR SPEC: YELLOW — SIGNIFICANT CHANGE UP
CREAT SERPL-MCNC: 1.26 MG/DL — SIGNIFICANT CHANGE UP (ref 0.5–1.3)
DIFF PNL FLD: ABNORMAL
EGFR: 63 ML/MIN/1.73M2 — SIGNIFICANT CHANGE UP
EOSINOPHIL # BLD AUTO: 0.07 K/UL — SIGNIFICANT CHANGE UP (ref 0–0.5)
EOSINOPHIL NFR BLD AUTO: 1.2 % — SIGNIFICANT CHANGE UP (ref 0–6)
ESTIMATED AVERAGE GLUCOSE: 108 MG/DL — SIGNIFICANT CHANGE UP (ref 68–114)
GLUCOSE SERPL-MCNC: 106 MG/DL — HIGH (ref 70–99)
GLUCOSE UR QL: NEGATIVE MG/DL — SIGNIFICANT CHANGE UP
HCT VFR BLD CALC: 45.2 % — SIGNIFICANT CHANGE UP (ref 39–50)
HGB BLD-MCNC: 14.9 G/DL — SIGNIFICANT CHANGE UP (ref 13–17)
IMM GRANULOCYTES NFR BLD AUTO: 0.2 % — SIGNIFICANT CHANGE UP (ref 0–0.9)
INR BLD: 1.6 RATIO — HIGH (ref 0.88–1.16)
KETONES UR-MCNC: NEGATIVE — SIGNIFICANT CHANGE UP
LEUKOCYTE ESTERASE UR-ACNC: ABNORMAL
LYMPHOCYTES # BLD AUTO: 0.88 K/UL — LOW (ref 1–3.3)
LYMPHOCYTES # BLD AUTO: 15.5 % — SIGNIFICANT CHANGE UP (ref 13–44)
MCHC RBC-ENTMCNC: 30.7 PG — SIGNIFICANT CHANGE UP (ref 27–34)
MCHC RBC-ENTMCNC: 33 GM/DL — SIGNIFICANT CHANGE UP (ref 32–36)
MCV RBC AUTO: 93.2 FL — SIGNIFICANT CHANGE UP (ref 80–100)
MONOCYTES # BLD AUTO: 0.44 K/UL — SIGNIFICANT CHANGE UP (ref 0–0.9)
MONOCYTES NFR BLD AUTO: 7.7 % — SIGNIFICANT CHANGE UP (ref 2–14)
NEUTROPHILS # BLD AUTO: 4.22 K/UL — SIGNIFICANT CHANGE UP (ref 1.8–7.4)
NEUTROPHILS NFR BLD AUTO: 74.3 % — SIGNIFICANT CHANGE UP (ref 43–77)
NITRITE UR-MCNC: NEGATIVE — SIGNIFICANT CHANGE UP
PH UR: 8 — SIGNIFICANT CHANGE UP (ref 5–8)
PLATELET # BLD AUTO: 302 K/UL — SIGNIFICANT CHANGE UP (ref 150–400)
POTASSIUM SERPL-MCNC: 4.4 MMOL/L — SIGNIFICANT CHANGE UP (ref 3.5–5.3)
POTASSIUM SERPL-SCNC: 4.4 MMOL/L — SIGNIFICANT CHANGE UP (ref 3.5–5.3)
PROT UR-MCNC: NEGATIVE — SIGNIFICANT CHANGE UP
PROTHROM AB SERPL-ACNC: 18.7 SEC — HIGH (ref 10.5–13.4)
RBC # BLD: 4.85 M/UL — SIGNIFICANT CHANGE UP (ref 4.2–5.8)
RBC # FLD: 12.7 % — SIGNIFICANT CHANGE UP (ref 10.3–14.5)
RBC CASTS # UR COMP ASSIST: ABNORMAL /HPF (ref 0–4)
SODIUM SERPL-SCNC: 136 MMOL/L — SIGNIFICANT CHANGE UP (ref 135–145)
SP GR SPEC: 1.01 — SIGNIFICANT CHANGE UP (ref 1.01–1.02)
UROBILINOGEN FLD QL: NEGATIVE MG/DL — SIGNIFICANT CHANGE UP
WBC # BLD: 5.68 K/UL — SIGNIFICANT CHANGE UP (ref 3.8–10.5)
WBC # FLD AUTO: 5.68 K/UL — SIGNIFICANT CHANGE UP (ref 3.8–10.5)
WBC UR QL: SIGNIFICANT CHANGE UP /HPF (ref 0–5)

## 2023-05-25 PROCEDURE — 93005 ELECTROCARDIOGRAM TRACING: CPT

## 2023-05-25 PROCEDURE — G0463: CPT

## 2023-05-25 PROCEDURE — 93010 ELECTROCARDIOGRAM REPORT: CPT

## 2023-05-25 PROCEDURE — 99204 OFFICE O/P NEW MOD 45 MIN: CPT

## 2023-05-25 RX ORDER — METOPROLOL TARTRATE 50 MG
1 TABLET ORAL
Qty: 0 | Refills: 0 | DISCHARGE

## 2023-05-25 NOTE — H&P PST ADULT - ATTENDING COMMENTS
patient was originally scheduled for mini-PCNL  he is not interested in ureteroscopy instead, understanding all the benefits but also the possible need for a second procedure.  will perform bilateral ureteroscopy today  this has been discussed with the patient and documented

## 2023-05-25 NOTE — H&P PST ADULT - PROBLEM SELECTOR PLAN 2
BP well controlled today.  Continue BP medications as prescribed.  CHF: continue Entresto as usual.   ECG reviewed.  medical and cardiology clearances in patient chart.

## 2023-05-25 NOTE — H&P PST ADULT - HISTORY OF PRESENT ILLNESS
69 yo M patient of Dr. Roque Pmhx Afib on Eliquis and Aspirin, CHF, HTN, CAD, LBBB, prediabetes, nephrolithiasis presents for PST. Hx painless gross hematuria and went to Dr. Roque for evaluation, as per Dr. Roque's reports- cytology performed- negative, urine culture performed- negative, Cystoscopy revealed BPH only., patient found to have large bilateral UPJ stones, with small stones in the kidneys and moderate hydronephrosis, HU ~ 1100 on CT. Dr. Roque is recommending staged bilateral PCNL, left mini-PCNL and right mini-PCNL to be scheduled for two separate days.      Current Meds  Metoprolol Succinate ER 25 MG Oral Tablet Extended Release 24 Hour; TAKE 1 TABLET  BY MOUTH EVERY DAY  Multaq 400 MG Oral Tablet; TAKE 1 TABLET TWICE DAILY,  WITH MORNING AND  EVENING MEAL  Entresto 24-26 MG Oral Tablet; Take 1 tablet twice daily  Eliquis 5 MG Oral Tablet; TAKE 1 TABLET Every twelve hours  Aspirin 81 MG Oral Tablet Delayed Release; TAKE 1 TABLET DAILY  Atorvastatin Calcium 10 MG Oral Tablet; TAKE 1 TABLET AT BEDTIME   69 yo M patient of Dr. Roque Pmhx Afib on Eliquis and Aspirin, CHF, HTN, CAD, LBBB, prediabetes, nephrolithiasis presents for PST. Hx painless gross hematuria and went to Dr. Roque for evaluation, as per Dr. Roque's reports- cytology performed- negative, urine culture performed- negative, Cystoscopy revealed BPH only, patient found to have large bilateral UPJ stones, with small stones in the kidneys and moderate hydronephrosis, HU ~ 1100 on CT. Dr. Roque is recommending staged bilateral PCNL, left mini-PCNL and right mini-PCNL to be scheduled for two separate days.

## 2023-05-25 NOTE — H&P PST ADULT - PROBLEM SELECTOR PLAN 1
left mini PCNL scheduled for 05/30/2023 with Dr. Roque.  Patient educated on surgical scrub, preadmission instructions, medical clearance and day of procedure medications, verbalizes understanding, teach back method utilized.  Hold multivitamin 7 days prior to surgery.

## 2023-05-25 NOTE — H&P PST ADULT - PROBLEM SELECTOR PLAN 4
cap 6 Moderate Risk,  SCDs ordered for day of procedure.  Surgical team to assess need for VTE prophylaxis

## 2023-05-25 NOTE — H&P PST ADULT - ASSESSMENT
67 yo M patient of Dr. Roque Pmhx Afib on Eliquis and Aspirin, CHF, HTN, CAD, LBBB, prediabetes, nephrolithiasis presents for PST. Hx painless gross hematuria and went to Dr. Roque for evaluation, as per Dr. Roque's reports- cytology performed- negative, urine culture performed- negative, Cystoscopy revealed BPH only., patient found to have large bilateral UPJ stones, with small stones in the kidneys and moderate hydronephrosis, HU ~ 1100 on CT. Dr. Roque is recommending staged bilateral PCNL, left mini-PCNL and right mini-PCNL to be scheduled for two separate days, left mini PCNL scheduled for 2023 with Dr. Roque.    OPIOID RISK TOOL    YOLANDA EACH BOX THAT APPLIES AND ADD TOTALS AT THE END    FAMILY HISTORY OF SUBSTANCE ABUSE                 FEMALE         MALE                                                Alcohol                             [  ]1 pt          [  ]3pts                                               Illegal Durgs                     [  ]2 pts        [  ]3pts                                               Rx Drugs                           [  ]4 pts        [  ]4 pts    PERSONAL HISTORY OF SUBSTANCE ABUSE                                                                                          Alcohol                             [  ]3 pts       [  ]3 pts                                               Illegal Drugs                     [  ]4 pts        [  ]4 pts                                               Rx Drugs                           [  ]5 pts        [  ]5 pts    AGE BETWEEN 16-45 YEARS                                      [  ]1 pt         [  ]1 pt    HISTORY OF PREADOLESCENT   SEXUAL ABUSE                                                             [  ]3 pts        [  ]0pts    PSYCHOLOGICAL DISEASE                     ADD, OCD, Bipolar, Schizophrenia        [  ]2 pts         [  ]2 pts                      Depression                                               [  ]1 pt           [  ]1 pt           SCORING TOTAL   (add numbers and type here)              (***)                                     A score of 3 or lower indicated LOW risk for future opioid abuse  A score of 4 to 7 indicated moderate risk for future opioid abuse  A score of 8 or higher indicates a high risk for opioid abuse      CAPRINI SCORE    AGE RELATED RISK FACTORS                                                             [ ] Age 41-60 years                                            (1 Point)  [ ] Age: 61-74 years                                           (2 Points)                 [ ] Age= 75 years                                                (3 Points)             DISEASE RELATED RISK FACTORS                                                       [ ] Edema in the lower extremities                 (1 Point)                     [ ] Varicose veins                                               (1 Point)                                 [ ] BMI > 25 Kg/m2                                            (1 Point)                                  [ ] Serious infection (ie PNA)                            (1 Point)                     [ ] Lung disease ( COPD, Emphysema)            (1 Point)                                                                          [ ] Acute myocardial infarction                         (1 Point)                  [ ] Congestive heart failure (in the previous month)  (1 Point)         [ ] Inflammatory bowel disease                            (1 Point)                  [ ] Central venous access, PICC or Port               (2 points)       (within the last month)                                                                [ ] Stroke (in the previous month)                        (5 Points)    [ ] Previous or present malignancy                       (2 points)                                                                                                                                                         HEMATOLOGY RELATED FACTORS                                                         [ ] Prior episodes of VTE                                     (3 Points)                     [ ] Positive family history for VTE                      (3 Points)                  [ ] Prothrombin 51089 A                                     (3 Points)                     [ ] Factor V Leiden                                                (3 Points)                        [ ] Lupus anticoagulants                                      (3 Points)                                                           [ ] Anticardiolipin antibodies                              (3 Points)                                                       [ ] High homocysteine in the blood                   (3 Points)                                             [ ] Other congenital or acquired thrombophilia      (3 Points)                                                [ ] Heparin induced thrombocytopenia                  (3 Points)                                        MOBILITY RELATED FACTORS  [ ] Bed rest                                                         (1 Point)  [ ] Plaster cast                                                    (2 points)  [ ] Bed bound for more than 72 hours           (2 Points)    GENDER SPECIFIC FACTORS  [ ] Pregnancy or had a baby within the last month   (1 Point)  [ ] Post-partum < 6 weeks                                   (1 Point)  [ ] Hormonal therapy  or oral contraception   (1 Point)  [ ] History of pregnancy complications              (1 point)  [ ] Unexplained or recurrent              (1 Point)    OTHER RISK FACTORS                                           (1 Point)  [ ] BMI >40, smoking, diabetes requiring insulin, chemotherapy  blood transfusions and length of surgery over 2 hours    SURGERY RELATED RISK FACTORS  [ ]  Section within the last month     (1 Point)  [ ] Minor surgery                                                  (1 Point)  [ ] Arthroscopic surgery                                       (2 Points)  [ ] Planned major surgery lasting more            (2 Points)      than 45 minutes     [ ] Elective hip or knee joint replacement       (5 points)       surgery                                                TRAUMA RELATED RISK FACTORS  [ ] Fracture of the hip, pelvis, or leg                       (5 Points)  [ ] Spinal cord injury resulting in paralysis             (5 points)       (in the previous month)    [ ] Paralysis  (less than 1 month)                             (5 Points)  [ ] Multiple Trauma within 1 month                        (5 Points)    Total Score [        ]    Caprini Score 0-2: Low Risk, NO VTE prophylaxis required for most patients, encourage ambulation  Caprini Score 3-6: Moderate Risk , pharmacologic VTE prophylaxis is indicated for most patients (in the absence of contraindications)  Caprini Score Greater than or =7: High risk, pharmocologic VTE prophylaxis indicated for most patients (in the absence of contraindications)                               67 yo M patient of Dr. Roque Pmhx Afib on Eliquis and Aspirin, CHF, HTN, CAD, LBBB, prediabetes, nephrolithiasis presents for PST. Hx painless gross hematuria and went to Dr. Roque for evaluation, as per Dr. Roque's reports- cytology performed- negative, urine culture performed- negative, Cystoscopy revealed BPH only., patient found to have large bilateral UPJ stones, with small stones in the kidneys and moderate hydronephrosis, HU ~ 1100 on CT. Dr. Roque is recommending staged bilateral PCNL, left mini-PCNL and right mini-PCNL to be scheduled for two separate days, left mini PCNL scheduled for 2023 with Dr. Roque.    OPIOID RISK TOOL    YOLANDA EACH BOX THAT APPLIES AND ADD TOTALS AT THE END    FAMILY HISTORY OF SUBSTANCE ABUSE                 FEMALE         MALE                                                Alcohol                             [  ]1 pt          [  ]3pts                                               Illegal Durgs                     [  ]2 pts        [  ]3pts                                               Rx Drugs                           [  ]4 pts        [  ]4 pts    PERSONAL HISTORY OF SUBSTANCE ABUSE                                                                                          Alcohol                             [  ]3 pts       [  ]3 pts                                               Illegal Drugs                     [  ]4 pts        [  ]4 pts                                               Rx Drugs                           [  ]5 pts        [  ]5 pts    AGE BETWEEN 16-45 YEARS                                      [  ]1 pt         [  ]1 pt    HISTORY OF PREADOLESCENT   SEXUAL ABUSE                                                             [  ]3 pts        [  ]0pts    PSYCHOLOGICAL DISEASE                     ADD, OCD, Bipolar, Schizophrenia        [  ]2 pts         [  ]2 pts                      Depression                                               [  ]1 pt           [  ]1 pt           SCORING TOTAL   (add numbers and type here)              (0)                                     A score of 3 or lower indicated LOW risk for future opioid abuse  A score of 4 to 7 indicated moderate risk for future opioid abuse  A score of 8 or higher indicates a high risk for opioid abuse      CAPRINI SCORE    AGE RELATED RISK FACTORS                                                             [ ] Age 41-60 years                                            (1 Point)  [x ] Age: 61-74 years                                           (2 Points)                 [ ] Age= 75 years                                                (3 Points)             DISEASE RELATED RISK FACTORS                                                       [ ] Edema in the lower extremities                 (1 Point)                     [ ] Varicose veins                                               (1 Point)                                 [x ] BMI > 25 Kg/m2                                            (1 Point)                                  [ ] Serious infection (ie PNA)                            (1 Point)                     [ ] Lung disease ( COPD, Emphysema)            (1 Point)                                                                          [ ] Acute myocardial infarction                         (1 Point)                  [ ] Congestive heart failure (in the previous month)  (1 Point)         [ ] Inflammatory bowel disease                            (1 Point)                  [ ] Central venous access, PICC or Port               (2 points)       (within the last month)                                                                [ ] Stroke (in the previous month)                        (5 Points)    [ ] Previous or present malignancy                       (2 points)                                                                                                                                                         HEMATOLOGY RELATED FACTORS                                                         [ ] Prior episodes of VTE                                     (3 Points)                     [ ] Positive family history for VTE                      (3 Points)                  [ ] Prothrombin 94712 A                                     (3 Points)                     [ ] Factor V Leiden                                                (3 Points)                        [ ] Lupus anticoagulants                                      (3 Points)                                                           [ ] Anticardiolipin antibodies                              (3 Points)                                                       [ ] High homocysteine in the blood                   (3 Points)                                             [ ] Other congenital or acquired thrombophilia      (3 Points)                                                [ ] Heparin induced thrombocytopenia                  (3 Points)                                        MOBILITY RELATED FACTORS  [ ] Bed rest                                                         (1 Point)  [ ] Plaster cast                                                    (2 points)  [ ] Bed bound for more than 72 hours           (2 Points)    GENDER SPECIFIC FACTORS  [ ] Pregnancy or had a baby within the last month   (1 Point)  [ ] Post-partum < 6 weeks                                   (1 Point)  [ ] Hormonal therapy  or oral contraception   (1 Point)  [ ] History of pregnancy complications              (1 point)  [ ] Unexplained or recurrent              (1 Point)    OTHER RISK FACTORS                                           (1 Point)  [x ] BMI >40, smoking, diabetes requiring insulin, chemotherapy  blood transfusions and length of surgery over 2 hours    SURGERY RELATED RISK FACTORS  [ ]  Section within the last month     (1 Point)  [ ] Minor surgery                                                  (1 Point)  [ ] Arthroscopic surgery                                       (2 Points)  [ x] Planned major surgery lasting more            (2 Points)      than 45 minutes     [ ] Elective hip or knee joint replacement       (5 points)       surgery                                                TRAUMA RELATED RISK FACTORS  [ ] Fracture of the hip, pelvis, or leg                       (5 Points)  [ ] Spinal cord injury resulting in paralysis             (5 points)       (in the previous month)    [ ] Paralysis  (less than 1 month)                             (5 Points)  [ ] Multiple Trauma within 1 month                        (5 Points)    Total Score [  6      ]    Caprini Score 0-2: Low Risk, NO VTE prophylaxis required for most patients, encourage ambulation  Caprini Score 3-6: Moderate Risk , pharmacologic VTE prophylaxis is indicated for most patients (in the absence of contraindications)  Caprini Score Greater than or =7: High risk, pharmocologic VTE prophylaxis indicated for most patients (in the absence of contraindications)

## 2023-05-25 NOTE — H&P PST ADULT - PROBLEM SELECTOR PLAN 3
Hold Aspirin 5 days prior to surgery, Hold Eliquis 2 days prior to surgery.  medical and cardiology clearances in patient chart.

## 2023-05-25 NOTE — H&P PST ADULT - NSICDXPASTMEDICALHX_GEN_ALL_CORE_FT
PAST MEDICAL HISTORY:  Atrial fibrillation     CHF (congestive heart failure)     HLD (hyperlipidemia)     Hypertension     LBBB (left bundle branch block)     Nephrolithiasis     Prediabetes

## 2023-05-25 NOTE — H&P PST ADULT - EKG AND INTERPRETATION
performed 05/17/2023 with cardiology  marked sinus bradycardia, LBBB, AS t wave abnormality  pending cardiology clearance reports sinus bradycardia, LBBB, VR49

## 2023-05-26 ENCOUNTER — NON-APPOINTMENT (OUTPATIENT)
Age: 68
End: 2023-05-26

## 2023-05-26 PROBLEM — I44.7 LEFT BUNDLE-BRANCH BLOCK, UNSPECIFIED: Chronic | Status: ACTIVE | Noted: 2023-05-25

## 2023-05-26 PROBLEM — I50.9 HEART FAILURE, UNSPECIFIED: Chronic | Status: ACTIVE | Noted: 2023-05-25

## 2023-05-26 PROBLEM — N20.0 CALCULUS OF KIDNEY: Chronic | Status: ACTIVE | Noted: 2023-05-25

## 2023-05-26 PROBLEM — E78.5 HYPERLIPIDEMIA, UNSPECIFIED: Chronic | Status: ACTIVE | Noted: 2023-05-25

## 2023-05-26 PROBLEM — R73.03 PREDIABETES: Chronic | Status: ACTIVE | Noted: 2023-05-25

## 2023-05-26 LAB
CULTURE RESULTS: SIGNIFICANT CHANGE UP
SPECIMEN SOURCE: SIGNIFICANT CHANGE UP

## 2023-05-26 NOTE — HISTORY OF PRESENT ILLNESS
[Atrial Fibrillation] : atrial fibrillation [Coronary Artery Disease] : coronary artery disease [No Pertinent Pulmonary History] : no history of asthma, COPD, sleep apnea, or smoking [No Adverse Anesthesia Reaction] : no adverse anesthesia reaction in self or family member [Chronic Anticoagulation] : chronic anticoagulation [(Patient denies any chest pain, claudication, dyspnea on exertion, orthopnea, palpitations or syncope)] : Patient denies any chest pain, claudication, dyspnea on exertion, orthopnea, palpitations or syncope [Recent Myocardial Infarction] : no recent myocardial infarction [Implantable Device/Pacemaker] : no implantable device/pacemaker [Chronic Kidney Disease] : no chronic kidney disease [Diabetes] : no diabetes [FreeTextEntry1] : Unilateral mini-PCNL [FreeTextEntry2] : 05/30/2023 [FreeTextEntry3] : Tarelizabeth Jude [FreeTextEntry4] : Mr. SONIA DAILEY is a pleasant 68 year old male with PMH of A-Fib on Eliquis, CHF, HTN, CAD who comes in to the office for preoperative evaluation for urology surgery with Dr Roque. \par Patient was cleared by cardio Dr Norton on 05/23/2023 and per cardio may hold Eliquis for 2 days prior and if necessary hold aspirin 5 days prior and resume both asap.\par Patient reports that is able to walk 4 blocks or a flight of stairs without getting chest pain, palpitations s or shortness of breath.

## 2023-05-26 NOTE — ASSESSMENT
[As per surgery] : as per surgery [FreeTextEntry4] : \par Mr. DAILEY is a 68 year male with PMH of A-Fib on Eliquis, CHF, HTN, CAD who comes to the office for preoperative evaluation. Patient has greater than 4 METS, is a high perioperative risk for Major adverse cardiac event. Patient has been cleared by cardio for this procedure on 05/23/2023 per note from 05/172023. If his CBC, CMP, PT/INR and PTT are unremarkable, patient would be medically optimized for this procedure.\par \par During conversation with patient extensive medical records were reviewed including but not limited to hospital records, out patient records, laboratory data \par In addition extensive time was also spent in reviewing diagnostic studies.\par \par Avoid NSAIDs, vitamins containing products prior to surgery. Per cardio may hold Eliquis for 2 days prior and if necessary hold aspirin 5 days prior and resume both asap.\par \par Counseling included abnormal lab results, differential diagnoses, treatment options, risks and benefits, lifestyle changes, current condition, medications, and dose adjustments. \par The patient was interactive, attentive, asked questions, and verbalized understanding.\par \par Fax this note to PST at [facility]: [phone] or [phone]

## 2023-05-26 NOTE — ADDENDUM
[FreeTextEntry1] : 2:09 PM 05/26/2023 Addendum. CBC, CMP, PT/INR, PTT reviewed. \par CBC unremarkable. PT/INR 39.5/1.6 and  PTT 18.7,  are elevated but this patient has been o Eliquis which he will stop 2 days before the procedure. He was also advised to stop ASA 5 days before the procedure. Would recommend to watch for bleeding signs during the procedure. Otherwise patient is medically optimized.

## 2023-05-29 ENCOUNTER — TRANSCRIPTION ENCOUNTER (OUTPATIENT)
Age: 68
End: 2023-05-29

## 2023-05-30 ENCOUNTER — RESULT REVIEW (OUTPATIENT)
Age: 68
End: 2023-05-30

## 2023-05-30 ENCOUNTER — TRANSCRIPTION ENCOUNTER (OUTPATIENT)
Age: 68
End: 2023-05-30

## 2023-05-30 ENCOUNTER — APPOINTMENT (OUTPATIENT)
Dept: UROLOGY | Facility: HOSPITAL | Age: 68
End: 2023-05-30

## 2023-05-30 ENCOUNTER — OUTPATIENT (OUTPATIENT)
Dept: INPATIENT UNIT | Facility: HOSPITAL | Age: 68
LOS: 1 days | End: 2023-05-30
Payer: COMMERCIAL

## 2023-05-30 VITALS
DIASTOLIC BLOOD PRESSURE: 48 MMHG | OXYGEN SATURATION: 100 % | RESPIRATION RATE: 16 BRPM | HEART RATE: 80 BPM | SYSTOLIC BLOOD PRESSURE: 148 MMHG | TEMPERATURE: 98 F | WEIGHT: 182.98 LBS | HEIGHT: 75 IN

## 2023-05-30 VITALS
TEMPERATURE: 97 F | RESPIRATION RATE: 18 BRPM | HEART RATE: 99 BPM | OXYGEN SATURATION: 100 % | DIASTOLIC BLOOD PRESSURE: 83 MMHG | SYSTOLIC BLOOD PRESSURE: 125 MMHG

## 2023-05-30 DIAGNOSIS — N20.0 CALCULUS OF KIDNEY: ICD-10-CM

## 2023-05-30 DIAGNOSIS — Z98.890 OTHER SPECIFIED POSTPROCEDURAL STATES: Chronic | ICD-10-CM

## 2023-05-30 LAB — GLUCOSE BLDC GLUCOMTR-MCNC: 98 MG/DL — SIGNIFICANT CHANGE UP (ref 70–99)

## 2023-05-30 PROCEDURE — C1758: CPT

## 2023-05-30 PROCEDURE — C2617: CPT

## 2023-05-30 PROCEDURE — 52356 CYSTO/URETERO W/LITHOTRIPSY: CPT | Mod: RT,22

## 2023-05-30 PROCEDURE — 74420 UROGRAPHY RTRGR +-KUB: CPT | Mod: 26

## 2023-05-30 PROCEDURE — 82962 GLUCOSE BLOOD TEST: CPT

## 2023-05-30 PROCEDURE — 82365 CALCULUS SPECTROSCOPY: CPT

## 2023-05-30 PROCEDURE — C1769: CPT

## 2023-05-30 PROCEDURE — 88300 SURGICAL PATH GROSS: CPT | Mod: 26

## 2023-05-30 PROCEDURE — 76000 FLUOROSCOPY <1 HR PHYS/QHP: CPT

## 2023-05-30 PROCEDURE — 88300 SURGICAL PATH GROSS: CPT

## 2023-05-30 PROCEDURE — C1889: CPT

## 2023-05-30 PROCEDURE — 52356 CYSTO/URETERO W/LITHOTRIPSY: CPT

## 2023-05-30 DEVICE — URETERAL CATH SOF-FLEX OPEN END 5FR .040" X 70CM: Type: IMPLANTABLE DEVICE | Site: BILATERAL | Status: FUNCTIONAL

## 2023-05-30 DEVICE — LASER FIBER SOLTIVE 200 BALL TIP: Type: IMPLANTABLE DEVICE | Site: BILATERAL | Status: FUNCTIONAL

## 2023-05-30 DEVICE — URETERAL SHEATH NAVIGATOR HD 12/14FR X 46CM: Type: IMPLANTABLE DEVICE | Site: BILATERAL | Status: FUNCTIONAL

## 2023-05-30 DEVICE — URETERAL STENT CONTOUR 7FR 28CM: Type: IMPLANTABLE DEVICE | Site: BILATERAL | Status: FUNCTIONAL

## 2023-05-30 DEVICE — STONE BASKET ZEROTIP NITINOL 4-WIRE 1.9FR 120CM X 12MM: Type: IMPLANTABLE DEVICE | Site: BILATERAL | Status: FUNCTIONAL

## 2023-05-30 DEVICE — GUIDEWIRE SENSOR DUAL-FLEX NITINOL STRAIGHT .035" X 150CM: Type: IMPLANTABLE DEVICE | Site: BILATERAL | Status: FUNCTIONAL

## 2023-05-30 RX ORDER — CEPHALEXIN 500 MG
1 CAPSULE ORAL
Qty: 9 | Refills: 0
Start: 2023-05-30 | End: 2023-06-01

## 2023-05-30 RX ORDER — CEFAZOLIN SODIUM 1 G
2000 VIAL (EA) INJECTION ONCE
Refills: 0 | Status: DISCONTINUED | OUTPATIENT
Start: 2023-05-30 | End: 2023-05-30

## 2023-05-30 RX ORDER — ACETAMINOPHEN 500 MG
975 TABLET ORAL ONCE
Refills: 0 | Status: COMPLETED | OUTPATIENT
Start: 2023-05-30 | End: 2023-05-30

## 2023-05-30 RX ORDER — PHENAZOPYRIDINE HCL 100 MG
1 TABLET ORAL
Qty: 6 | Refills: 0
Start: 2023-05-30 | End: 2023-06-01

## 2023-05-30 RX ORDER — TAMSULOSIN HYDROCHLORIDE 0.4 MG/1
1 CAPSULE ORAL
Qty: 21 | Refills: 0
Start: 2023-05-30 | End: 2023-06-19

## 2023-05-30 RX ORDER — SODIUM CHLORIDE 9 MG/ML
1000 INJECTION, SOLUTION INTRAVENOUS
Refills: 0 | Status: DISCONTINUED | OUTPATIENT
Start: 2023-05-30 | End: 2023-05-30

## 2023-05-30 RX ORDER — FENTANYL CITRATE 50 UG/ML
25 INJECTION INTRAVENOUS
Refills: 0 | Status: DISCONTINUED | OUTPATIENT
Start: 2023-05-30 | End: 2023-05-30

## 2023-05-30 RX ADMIN — Medication 975 MILLIGRAM(S): at 06:57

## 2023-05-30 NOTE — ASU DISCHARGE PLAN (ADULT/PEDIATRIC) - NS MD DC FALL RISK RISK
For information on Fall & Injury Prevention, visit: [email or web address] OR  [email or web address] OR  https://www.cdc.gov/steadi/patient.html

## 2023-05-30 NOTE — ASU DISCHARGE PLAN (ADULT/PEDIATRIC) - CARE PROVIDER_API CALL
[facility] Ro[facility]  Urology  [address], [address]  [place] [address]  Phone: [phone]  Fax: [phone]  Follow Up Time: 1 week

## 2023-05-30 NOTE — BRIEF OPERATIVE NOTE - OPERATION/FINDINGS
only right side  large stone  mostly dusting  basketing of large fragments at the end  7/26 stent, no string

## 2023-05-30 NOTE — ASU DISCHARGE PLAN (ADULT/PEDIATRIC) - ASU DC SPECIAL INSTRUCTIONSFT
start taking your eliquis in 2 days start taking your eliquis in 2 days  It is common to have blood in the urine after your procedure. It may be pink or red.  If you have significant amount of clots in urine, difficulty urinating or unable to urinate inform your doctor. Drink plenty of liquids.

## 2023-05-30 NOTE — ASU DISCHARGE PLAN (ADULT/PEDIATRIC) - CALL YOUR DOCTOR IF YOU HAVE ANY OF THE FOLLOWING:
Fever greater than (need to indicate Fahrenheit or Celsius) Bleeding that does not stop/Pain not relieved by Medications/Fever greater than (need to indicate Fahrenheit or Celsius)/Wound/Surgical Site with redness, or foul smelling discharge or pus/Unable to urinate

## 2023-06-01 RX ORDER — APIXABAN 2.5 MG/1
1 TABLET, FILM COATED ORAL
Qty: 0 | Refills: 0 | DISCHARGE
Start: 2023-06-01

## 2023-06-02 ENCOUNTER — APPOINTMENT (OUTPATIENT)
Dept: CT IMAGING | Facility: CLINIC | Age: 68
End: 2023-06-02
Payer: COMMERCIAL

## 2023-06-02 ENCOUNTER — OUTPATIENT (OUTPATIENT)
Dept: OUTPATIENT SERVICES | Facility: HOSPITAL | Age: 68
LOS: 1 days | End: 2023-06-02
Payer: COMMERCIAL

## 2023-06-02 DIAGNOSIS — Z98.890 OTHER SPECIFIED POSTPROCEDURAL STATES: Chronic | ICD-10-CM

## 2023-06-02 DIAGNOSIS — N20.0 CALCULUS OF KIDNEY: ICD-10-CM

## 2023-06-02 PROCEDURE — 74176 CT ABD & PELVIS W/O CONTRAST: CPT | Mod: 26

## 2023-06-02 PROCEDURE — 74176 CT ABD & PELVIS W/O CONTRAST: CPT

## 2023-06-07 LAB — SURGICAL PATHOLOGY STUDY: SIGNIFICANT CHANGE UP

## 2023-06-12 ENCOUNTER — RX CHANGE (OUTPATIENT)
Age: 68
End: 2023-06-12

## 2023-06-12 ENCOUNTER — TRANSCRIPTION ENCOUNTER (OUTPATIENT)
Age: 68
End: 2023-06-12

## 2023-06-12 LAB
CELL MATERIAL STONE EST-MCNT: SIGNIFICANT CHANGE UP
LABORATORY COMMENT REPORT: SIGNIFICANT CHANGE UP
NIDUS STONE QN: SIGNIFICANT CHANGE UP

## 2023-06-12 NOTE — ASU PATIENT PROFILE, ADULT - NSICDXPASTMEDICALHX_GEN_ALL_CORE_FT
Statement Selected
PAST MEDICAL HISTORY:  Atrial fibrillation     CHF (congestive heart failure)     HLD (hyperlipidemia)     Hypertension     LBBB (left bundle branch block)     Nephrolithiasis     Prediabetes

## 2023-06-13 ENCOUNTER — OUTPATIENT (OUTPATIENT)
Dept: INPATIENT UNIT | Facility: HOSPITAL | Age: 68
LOS: 1 days | End: 2023-06-13
Payer: COMMERCIAL

## 2023-06-13 ENCOUNTER — APPOINTMENT (OUTPATIENT)
Dept: UROLOGY | Facility: HOSPITAL | Age: 68
End: 2023-06-13

## 2023-06-13 ENCOUNTER — TRANSCRIPTION ENCOUNTER (OUTPATIENT)
Age: 68
End: 2023-06-13

## 2023-06-13 VITALS
TEMPERATURE: 99 F | WEIGHT: 182.98 LBS | HEART RATE: 61 BPM | HEIGHT: 72 IN | OXYGEN SATURATION: 100 % | SYSTOLIC BLOOD PRESSURE: 127 MMHG | DIASTOLIC BLOOD PRESSURE: 63 MMHG | RESPIRATION RATE: 20 BRPM

## 2023-06-13 VITALS
TEMPERATURE: 98 F | RESPIRATION RATE: 20 BRPM | HEART RATE: 77 BPM | SYSTOLIC BLOOD PRESSURE: 122 MMHG | OXYGEN SATURATION: 99 % | DIASTOLIC BLOOD PRESSURE: 70 MMHG

## 2023-06-13 DIAGNOSIS — Z98.890 OTHER SPECIFIED POSTPROCEDURAL STATES: Chronic | ICD-10-CM

## 2023-06-13 DIAGNOSIS — N20.0 CALCULUS OF KIDNEY: ICD-10-CM

## 2023-06-13 PROCEDURE — 74420 UROGRAPHY RTRGR +-KUB: CPT | Mod: 26

## 2023-06-13 PROCEDURE — C1747: CPT

## 2023-06-13 PROCEDURE — 52356 CYSTO/URETERO W/LITHOTRIPSY: CPT | Mod: 50,22

## 2023-06-13 PROCEDURE — C1889: CPT

## 2023-06-13 PROCEDURE — C1758: CPT

## 2023-06-13 PROCEDURE — 52356 CYSTO/URETERO W/LITHOTRIPSY: CPT | Mod: 50

## 2023-06-13 PROCEDURE — C1769: CPT

## 2023-06-13 DEVICE — GUIDEWIRE SENSOR DUAL-FLEX NITINOL STRAIGHT .035" X 150CM: Type: IMPLANTABLE DEVICE | Site: LEFT | Status: FUNCTIONAL

## 2023-06-13 DEVICE — URETERAL CATH OPEN END FLEXI-TIP 5FR .038" X 70CM: Type: IMPLANTABLE DEVICE | Site: LEFT | Status: FUNCTIONAL

## 2023-06-13 DEVICE — URETERAL SHEATH NAVIGATOR HD 12/14FR X 46CM: Type: IMPLANTABLE DEVICE | Site: LEFT | Status: FUNCTIONAL

## 2023-06-13 DEVICE — LASER FIBER SOLTIVE 200 BALL TIP: Type: IMPLANTABLE DEVICE | Site: LEFT | Status: FUNCTIONAL

## 2023-06-13 DEVICE — URETEROSCOPE LITHOVUE DISP: Type: IMPLANTABLE DEVICE | Site: LEFT | Status: FUNCTIONAL

## 2023-06-13 RX ORDER — ACETAMINOPHEN 500 MG
975 TABLET ORAL ONCE
Refills: 0 | Status: COMPLETED | OUTPATIENT
Start: 2023-06-13 | End: 2023-06-13

## 2023-06-13 RX ORDER — PHENAZOPYRIDINE HCL 100 MG
1 TABLET ORAL
Qty: 6 | Refills: 0
Start: 2023-06-13 | End: 2023-06-15

## 2023-06-13 RX ORDER — CEPHALEXIN 500 MG
6 CAPSULE ORAL
Qty: 6 | Refills: 1
Start: 2023-06-13 | End: 2023-06-18

## 2023-06-13 RX ORDER — HYDROMORPHONE HYDROCHLORIDE 2 MG/ML
0.5 INJECTION INTRAMUSCULAR; INTRAVENOUS; SUBCUTANEOUS
Refills: 0 | Status: DISCONTINUED | OUTPATIENT
Start: 2023-06-13 | End: 2023-06-13

## 2023-06-13 RX ORDER — CEFAZOLIN SODIUM 1 G
2000 VIAL (EA) INJECTION ONCE
Refills: 0 | Status: DISCONTINUED | OUTPATIENT
Start: 2023-06-13 | End: 2023-06-13

## 2023-06-13 RX ORDER — SODIUM CHLORIDE 9 MG/ML
3 INJECTION INTRAMUSCULAR; INTRAVENOUS; SUBCUTANEOUS EVERY 8 HOURS
Refills: 0 | Status: DISCONTINUED | OUTPATIENT
Start: 2023-06-13 | End: 2023-06-13

## 2023-06-13 RX ADMIN — Medication 975 MILLIGRAM(S): at 16:41

## 2023-06-13 NOTE — ASU DISCHARGE PLAN (ADULT/PEDIATRIC) - ASU DC SPECIAL INSTRUCTIONSFT
Please follow up with Dr. Roque as discussed in 3 weeks he will have a ct scan set up for you prior to your visit, they will get in contact with you    Take antibiotics as directed    take motrin and or tylenol over the counter as needed for pain, if you take motrin please take with food     Take pyrdium for bladder spasms as needed , it may turn your urine orange, do not be alarmed

## 2023-06-13 NOTE — ASU DISCHARGE PLAN (ADULT/PEDIATRIC) - CARE PROVIDER_API CALL
[facility] Roque  Urology  [address], [address]  [place] [address]  Phone: [phone]  Fax: [phone]  Follow Up Time:

## 2023-06-13 NOTE — ASU PREOP CHECKLIST - HEIGHT IN INCHES
Diane FE 1/20 1-20 MG-MCG per tablet 28 tablet 0 8/22/2022     Sig: BULK ITEM-REORDER WHEN NEEDED: TAKE 1 TABLET BY MOUTH DAILY.        System approved, Thank you Refill Center        0

## 2023-06-20 NOTE — ASSESSMENT
[FreeTextEntry1] : 68 year old male with HTN, A.fib, CAD here for initial visit for discussion of labs and evaluation of renal function. \par \par -reports recent CT w/ large bilateral UPJ stone, small b/l stones and moderate HDN\par -Following urology and being planned for stagged PCNL\par -Patient reports he is still considering ureteroscopy.\par \par SCr 1, GFR 81\par UA  reviewed, pr 100 mg/dl, large blood\par Last CT urogram also reviewed and discussed \par \par Management per urology\par Patient will also undergo stone analysis\par He is drinking plenty of fluids\par Post procedure will evaluate for HCTZ

## 2023-06-20 NOTE — HISTORY OF PRESENT ILLNESS
[FreeTextEntry1] : HPI: Patient is a 68 year old male with HTN, A.fib, CAD here for initial visit for discussion of labs and evaluation of renal function. \par \par -reports recent CT w/ large bilateral UPJ stone, small b/l stones and moderate HDN\par -Following urology and being planned for stagged PCNL\par -Patient reports he is still considering ureteroscopy.\par \par Denies dysuria, gross hematuria, SOB, CP, cough, expectoration, fever, chills, palpitation, syncope, urgency, hesitancy, swelling on feet, joint pain. No history of chronic NSAID use or renal diseases in the family. \par \par REVIEW OF SYSTEM:  All systems were reviewed in detail.  Pertinent positive and negatives have been mentioned in history of present illness.  The rest are negative.

## 2023-06-27 ENCOUNTER — NON-APPOINTMENT (OUTPATIENT)
Age: 68
End: 2023-06-27

## 2023-07-03 ENCOUNTER — RX CHANGE (OUTPATIENT)
Age: 68
End: 2023-07-03

## 2023-07-03 ENCOUNTER — OUTPATIENT (OUTPATIENT)
Dept: OUTPATIENT SERVICES | Facility: HOSPITAL | Age: 68
LOS: 1 days | End: 2023-07-03
Payer: COMMERCIAL

## 2023-07-03 VITALS
HEART RATE: 50 BPM | WEIGHT: 186.51 LBS | RESPIRATION RATE: 20 BRPM | HEIGHT: 72 IN | DIASTOLIC BLOOD PRESSURE: 60 MMHG | SYSTOLIC BLOOD PRESSURE: 90 MMHG | TEMPERATURE: 97 F | OXYGEN SATURATION: 98 %

## 2023-07-03 DIAGNOSIS — I48.0 PAROXYSMAL ATRIAL FIBRILLATION: ICD-10-CM

## 2023-07-03 DIAGNOSIS — Z29.9 ENCOUNTER FOR PROPHYLACTIC MEASURES, UNSPECIFIED: ICD-10-CM

## 2023-07-03 DIAGNOSIS — Z98.890 OTHER SPECIFIED POSTPROCEDURAL STATES: Chronic | ICD-10-CM

## 2023-07-03 DIAGNOSIS — Z01.818 ENCOUNTER FOR OTHER PREPROCEDURAL EXAMINATION: ICD-10-CM

## 2023-07-03 LAB
ANION GAP SERPL CALC-SCNC: 10 MMOL/L — SIGNIFICANT CHANGE UP (ref 5–17)
APTT BLD: 37.7 SEC — HIGH (ref 27.5–35.5)
BASOPHILS # BLD AUTO: 0.08 K/UL — SIGNIFICANT CHANGE UP (ref 0–0.2)
BASOPHILS NFR BLD AUTO: 2 % — SIGNIFICANT CHANGE UP (ref 0–2)
BLD GP AB SCN SERPL QL: SIGNIFICANT CHANGE UP
BUN SERPL-MCNC: 14.9 MG/DL — SIGNIFICANT CHANGE UP (ref 8–20)
CALCIUM SERPL-MCNC: 9 MG/DL — SIGNIFICANT CHANGE UP (ref 8.4–10.5)
CHLORIDE SERPL-SCNC: 104 MMOL/L — SIGNIFICANT CHANGE UP (ref 96–108)
CO2 SERPL-SCNC: 28 MMOL/L — SIGNIFICANT CHANGE UP (ref 22–29)
CREAT SERPL-MCNC: 0.98 MG/DL — SIGNIFICANT CHANGE UP (ref 0.5–1.3)
EGFR: 85 ML/MIN/1.73M2 — SIGNIFICANT CHANGE UP
EOSINOPHIL # BLD AUTO: 0.19 K/UL — SIGNIFICANT CHANGE UP (ref 0–0.5)
EOSINOPHIL NFR BLD AUTO: 4.9 % — SIGNIFICANT CHANGE UP (ref 0–6)
GLUCOSE SERPL-MCNC: 123 MG/DL — HIGH (ref 70–99)
HCT VFR BLD CALC: 37 % — LOW (ref 39–50)
HGB BLD-MCNC: 11.9 G/DL — LOW (ref 13–17)
IMM GRANULOCYTES NFR BLD AUTO: 0 % — SIGNIFICANT CHANGE UP (ref 0–0.9)
INR BLD: 1.54 RATIO — HIGH (ref 0.88–1.16)
LYMPHOCYTES # BLD AUTO: 0.76 K/UL — LOW (ref 1–3.3)
LYMPHOCYTES # BLD AUTO: 19.4 % — SIGNIFICANT CHANGE UP (ref 13–44)
MAGNESIUM SERPL-MCNC: 2 MG/DL — SIGNIFICANT CHANGE UP (ref 1.6–2.6)
MCHC RBC-ENTMCNC: 30.1 PG — SIGNIFICANT CHANGE UP (ref 27–34)
MCHC RBC-ENTMCNC: 32.2 GM/DL — SIGNIFICANT CHANGE UP (ref 32–36)
MCV RBC AUTO: 93.4 FL — SIGNIFICANT CHANGE UP (ref 80–100)
MONOCYTES # BLD AUTO: 0.25 K/UL — SIGNIFICANT CHANGE UP (ref 0–0.9)
MONOCYTES NFR BLD AUTO: 6.4 % — SIGNIFICANT CHANGE UP (ref 2–14)
NEUTROPHILS # BLD AUTO: 2.63 K/UL — SIGNIFICANT CHANGE UP (ref 1.8–7.4)
NEUTROPHILS NFR BLD AUTO: 67.3 % — SIGNIFICANT CHANGE UP (ref 43–77)
PLATELET # BLD AUTO: 269 K/UL — SIGNIFICANT CHANGE UP (ref 150–400)
POTASSIUM SERPL-MCNC: 4.2 MMOL/L — SIGNIFICANT CHANGE UP (ref 3.5–5.3)
POTASSIUM SERPL-SCNC: 4.2 MMOL/L — SIGNIFICANT CHANGE UP (ref 3.5–5.3)
PROTHROM AB SERPL-ACNC: 17.9 SEC — HIGH (ref 10.5–13.4)
RBC # BLD: 3.96 M/UL — LOW (ref 4.2–5.8)
RBC # FLD: 13.9 % — SIGNIFICANT CHANGE UP (ref 10.3–14.5)
SODIUM SERPL-SCNC: 142 MMOL/L — SIGNIFICANT CHANGE UP (ref 135–145)
T3 SERPL-MCNC: 80 NG/DL — SIGNIFICANT CHANGE UP (ref 80–200)
T4 AB SER-ACNC: 5.4 UG/DL — SIGNIFICANT CHANGE UP (ref 4.5–12)
TSH SERPL-MCNC: 6.8 UIU/ML — HIGH (ref 0.27–4.2)
WBC # BLD: 3.91 K/UL — SIGNIFICANT CHANGE UP (ref 3.8–10.5)
WBC # FLD AUTO: 3.91 K/UL — SIGNIFICANT CHANGE UP (ref 3.8–10.5)

## 2023-07-03 PROCEDURE — 93010 ELECTROCARDIOGRAM REPORT: CPT

## 2023-07-03 PROCEDURE — 93005 ELECTROCARDIOGRAM TRACING: CPT

## 2023-07-03 PROCEDURE — G0463: CPT

## 2023-07-03 RX ORDER — ACETAMINOPHEN 500 MG
2 TABLET ORAL
Qty: 0 | Refills: 0 | DISCHARGE

## 2023-07-03 RX ORDER — DRONEDARONE 400 MG/1
1 TABLET, FILM COATED ORAL
Refills: 0 | DISCHARGE

## 2023-07-03 NOTE — H&P PST ADULT - MUSCULOSKELETAL
ROM intact/no calf tenderness/strength 5/5 bilateral upper extremities/strength 5/5 bilateral lower extremities/abnormal gait negative

## 2023-07-03 NOTE — H&P PST ADULT - ASSESSMENT
67 yo M PMH of nephrolithiasis, persistent atrial fibrillation s/p DCCV, cardiomyopathy, and LBBB. In 2022 he was found to be in Afib during routine PCP evaluation. 12 lead ECG in 2020 was last known sinus rhythm. On ECG 2022 he was in Afib with LBBB at 116 bpm. TTE revealed LVEF 32%. He denies associated symptoms. 2 week event monitor from  to 2022 revealed persistent Afib with average rate 97 bpm (range  bpm). A couple months prior to the Afib diagnosis he had a sudden transient loss of vision. Cardiac CT revealed nonsignificant LAD stenosis. DCCV was done 2022, and amiodarone was initiated. He had subsequent sinus bradycardia, metoprolol was lowered to 50 mg qd. During evaluation with Dr Liz 2023 he was maintaining sinus rhythm and reported no change in symptoms. During subsequent follow up with PCP Dr Kline on 2023 recurrent Afib was noted. He had an elevated TSH 8.2 on 2023, amiodarone was stopped, Multaq 400 mg BID started. He remains on metoprolol 25 mg QD and eliquis 5 mg BID. Patient now presents in anticipation of elective radiofrequency ablation of atrial fibrillation w/Dr Liz scheduled for 7/10/2023    Plan:   Labs pending.   Pre-procedure instructions provided (verbal & written) as follows:  Ablation 7/10/2023  - Last dose Eliquis 2023 PM (NO a/c day of ablation).   - NPO after midnight prior except meds w/ sips of water.   - Hold the following medications the morning of the procedure: eliquis    Patient was educated on preop preparation with written and verbal instructions. Pt was educated on NSAIDs, multivitamins and herbals that increase the risk of bleeding and need to be stopped 7 days before procedure. Pt verbalized understanding of the above.     CAPRINI VTE 2.0 SCORE [CLOT updated 2019]    AGE RELATED RISK FACTORS                                                       MOBILITY RELATED FACTORS  [ ] Age 41-60 years                                            (1 Point)                    [ ] Bed rest                                                        (1 Point)  [ x] Age: 61-74 years                                           (2 Points)                  [ ] Plaster cast                                                   (2 Points)  [ ] Age= 75 years                                              (3 Points)                    [ ] Bed bound for more than 72 hours                 (2 Points)    DISEASE RELATED RISK FACTORS                                               GENDER SPECIFIC FACTORS  [ ] Edema in the lower extremities                       (1 Point)              [ ] Pregnancy                                                     (1 Point)  [ ] Varicose veins                                               (1 Point)                     [ ] Post-partum < 6 weeks                                   (1 Point)             [ ] BMI > 25 Kg/m2                                            (1 Point)                     [ ] Hormonal therapy  or oral contraception          (1 Point)                 [ ] Sepsis (in the previous month)                        (1 Point)               [ ] History of pregnancy complications                 (1 point)  [ ] Pneumonia or serious lung disease                                               [ ] Unexplained or recurrent                     (1 Point)           (in the previous month)                               (1 Point)  [ ] Abnormal pulmonary function test                     (1 Point)                 SURGERY RELATED RISK FACTORS  [ ] Acute myocardial infarction                              (1 Point)               [ ]  Section                                             (1 Point)  [ ] Congestive heart failure (in the previous month)  (1 Point)      [ ] Minor surgery                                                  (1 Point)   [ ] Inflammatory bowel disease                             (1 Point)               [ ] Arthroscopic surgery                                        (2 Points)  [ ] Central venous access                                      (2 Points)                [x ] General surgery lasting more than 45 minutes (2 points)  [ ] Malignancy- Present or previous                   (2 Points)                [ ] Elective arthroplasty                                         (5 points)    [ ] Stroke (in the previous month)                          (5 Points)                                                                                                                                                           HEMATOLOGY RELATED FACTORS                                                 TRAUMA RELATED RISK FACTORS  [ ] Prior episodes of VTE                                     (3 Points)                [ ] Fracture of the hip, pelvis, or leg                       (5 Points)  [ ] Positive family history for VTE                         (3 Points)             [ ] Acute spinal cord injury (in the previous month)  (5 Points)  [ ] Prothrombin 34265 A                                     (3 Points)               [ ] Paralysis  (less than 1 month)                             (5 Points)  [ ] Factor V Leiden                                             (3 Points)                  [ ] Multiple Trauma within 1 month                        (5 Points)  [ ] Lupus anticoagulants                                     (3 Points)                                                           [ ] Anticardiolipin antibodies                               (3 Points)                                                       [ ] High homocysteine in the blood                      (3 Points)                                             [ ] Other congenital or acquired thrombophilia      (3 Points)                                                [ ] Heparin induced thrombocytopenia                  (3 Points)                                     Total Score [     4     ]

## 2023-07-03 NOTE — H&P PST ADULT - NEGATIVE GENERAL GENITOURINARY SYMPTOMS
no flank pain L/no flank pain R/no bladder infections/no incontinence/no dysuria/normal urinary frequency/no nocturia

## 2023-07-03 NOTE — H&P PST ADULT - CARDIOVASCULAR
regular rate and rhythm/S1 S2 present/no gallops/no rub/no murmur/no JVD/no pedal edema/bradycardia details…

## 2023-07-03 NOTE — H&P PST ADULT - NSICDXPASTMEDICALHX_GEN_ALL_CORE_FT
PAST MEDICAL HISTORY:  CHF (congestive heart failure)     HLD (hyperlipidemia)     Hypertension     LBBB (left bundle branch block)     Nephrolithiasis     Paroxysmal atrial fibrillation     Prediabetes

## 2023-07-03 NOTE — H&P PST ADULT - HISTORY OF PRESENT ILLNESS
67 yo M PMH of nephrolithiasis, persistent atrial fibrillation s/p DCCV, cardiomyopathy, and LBBB. In 11/2022 he was found to be in Afib during routine PCP evaluation. 12 lead ECG in 2/2020 was last known sinus rhythm. On ECG 11/23/2022 he was in Afib with LBBB at 116 bpm. TTE revealed LVEF 32%. He denies associated symptoms. 2 week event monitor from 12/9 to 12/23/2022 revealed persistent Afib with average rate 97 bpm (range  bpm). A couple months prior to the Afib diagnosis he had a sudden transient loss of vision. Cardiac CT revealed nonsignificant LAD stenosis. DCCV was done 12/28/2022, and amiodarone was initiated. He had subsequent sinus bradycardia, metoprolol was lowered to 50 mg qd. During evaluation with Dr Liz 1/19/2023 he was maintaining sinus rhythm and reported no change in symptoms. During subsequent follow up with PCP Dr Kline on 4/12/2023 recurrent Afib was noted. He had an elevated TSH 8.2 on 4/12/2023, amiodarone was stopped, Multaq 400 mg BID started. He remains on metoprolol 25 mg QD and eliquis 5 mg BID. Patient now presents in anticipation of elective radiofrequency ablation of atrial fibrillation w/Dr Liz scheduled for 7/10/2023      Cardiac Results/Data:    MUNIR 12/28/2022 Summary:   1. Patient tachycardic during the entire study.   2. No cardiac mass, vegetations, thrombus or shunts visualized.   3. Moderately enlarged left atrium.   4. No left atrial or left atrial appendage thrombus visualized. Prominent left atrial appendage and decreased left atrial appendage velocities. No PFO.   5. Left ventricular ejection fraction, by visual estimation, is 30 to 35%.   6. The right atrium is normal in size.   7. Normal right ventricular size and function.   8. Mild tricuspid regurgitation.   9. There is no evidence of pericardial effusion.  10. EP attempted cardioversion. Patient failed to convert to sinus rhythm at the end of the procedure.

## 2023-07-07 ENCOUNTER — APPOINTMENT (OUTPATIENT)
Dept: CT IMAGING | Facility: CLINIC | Age: 68
End: 2023-07-07
Payer: COMMERCIAL

## 2023-07-07 ENCOUNTER — OUTPATIENT (OUTPATIENT)
Dept: OUTPATIENT SERVICES | Facility: HOSPITAL | Age: 68
LOS: 1 days | End: 2023-07-07
Payer: COMMERCIAL

## 2023-07-07 DIAGNOSIS — Z98.890 OTHER SPECIFIED POSTPROCEDURAL STATES: Chronic | ICD-10-CM

## 2023-07-07 DIAGNOSIS — N20.0 CALCULUS OF KIDNEY: ICD-10-CM

## 2023-07-07 PROBLEM — I48.0 PAROXYSMAL ATRIAL FIBRILLATION: Chronic | Status: ACTIVE | Noted: 2023-07-03

## 2023-07-07 PROCEDURE — 74176 CT ABD & PELVIS W/O CONTRAST: CPT | Mod: 26

## 2023-07-07 PROCEDURE — 74176 CT ABD & PELVIS W/O CONTRAST: CPT

## 2023-07-10 ENCOUNTER — TRANSCRIPTION ENCOUNTER (OUTPATIENT)
Age: 68
End: 2023-07-10

## 2023-07-10 ENCOUNTER — INPATIENT (INPATIENT)
Facility: HOSPITAL | Age: 68
LOS: 0 days | Discharge: HOME | DRG: 274 | End: 2023-07-11
Attending: STUDENT IN AN ORGANIZED HEALTH CARE EDUCATION/TRAINING PROGRAM | Admitting: STUDENT IN AN ORGANIZED HEALTH CARE EDUCATION/TRAINING PROGRAM
Payer: COMMERCIAL

## 2023-07-10 VITALS
RESPIRATION RATE: 22 BRPM | SYSTOLIC BLOOD PRESSURE: 116 MMHG | DIASTOLIC BLOOD PRESSURE: 67 MMHG | HEART RATE: 47 BPM | OXYGEN SATURATION: 99 % | TEMPERATURE: 99 F

## 2023-07-10 DIAGNOSIS — Z98.890 OTHER SPECIFIED POSTPROCEDURAL STATES: Chronic | ICD-10-CM

## 2023-07-10 DIAGNOSIS — I48.0 PAROXYSMAL ATRIAL FIBRILLATION: ICD-10-CM

## 2023-07-10 LAB — ABO RH CONFIRMATION: SIGNIFICANT CHANGE UP

## 2023-07-10 PROCEDURE — 93656 COMPRE EP EVAL ABLTJ ATR FIB: CPT

## 2023-07-10 PROCEDURE — 93655 ICAR CATH ABLTJ DSCRT ARRHYT: CPT

## 2023-07-10 RX ORDER — ATORVASTATIN CALCIUM 80 MG/1
1 TABLET, FILM COATED ORAL
Qty: 0 | Refills: 0 | DISCHARGE

## 2023-07-10 RX ORDER — ALPRAZOLAM 0.25 MG
0.25 TABLET ORAL EVERY 6 HOURS
Refills: 0 | Status: DISCONTINUED | OUTPATIENT
Start: 2023-07-10 | End: 2023-07-11

## 2023-07-10 RX ORDER — FUROSEMIDE 40 MG
20 TABLET ORAL ONCE
Refills: 0 | Status: COMPLETED | OUTPATIENT
Start: 2023-07-10 | End: 2023-07-10

## 2023-07-10 RX ORDER — ACETAMINOPHEN 500 MG
650 TABLET ORAL EVERY 6 HOURS
Refills: 0 | Status: DISCONTINUED | OUTPATIENT
Start: 2023-07-10 | End: 2023-07-11

## 2023-07-10 RX ORDER — ASPIRIN/CALCIUM CARB/MAGNESIUM 324 MG
1 TABLET ORAL
Qty: 0 | Refills: 0 | DISCHARGE

## 2023-07-10 RX ORDER — ASPIRIN/CALCIUM CARB/MAGNESIUM 324 MG
81 TABLET ORAL DAILY
Refills: 0 | Status: DISCONTINUED | OUTPATIENT
Start: 2023-07-10 | End: 2023-07-11

## 2023-07-10 RX ORDER — APIXABAN 2.5 MG/1
5 TABLET, FILM COATED ORAL
Refills: 0 | Status: DISCONTINUED | OUTPATIENT
Start: 2023-07-10 | End: 2023-07-11

## 2023-07-10 RX ORDER — METOPROLOL TARTRATE 50 MG
1 TABLET ORAL
Refills: 0 | DISCHARGE

## 2023-07-10 RX ORDER — DRONEDARONE 400 MG/1
1 TABLET, FILM COATED ORAL
Refills: 0 | DISCHARGE

## 2023-07-10 RX ORDER — BENZOCAINE AND MENTHOL 5; 1 G/100ML; G/100ML
1 LIQUID ORAL
Refills: 0 | Status: DISCONTINUED | OUTPATIENT
Start: 2023-07-10 | End: 2023-07-11

## 2023-07-10 RX ORDER — ATORVASTATIN CALCIUM 80 MG/1
10 TABLET, FILM COATED ORAL AT BEDTIME
Refills: 0 | Status: DISCONTINUED | OUTPATIENT
Start: 2023-07-10 | End: 2023-07-11

## 2023-07-10 RX ORDER — METOPROLOL TARTRATE 50 MG
25 TABLET ORAL DAILY
Refills: 0 | Status: DISCONTINUED | OUTPATIENT
Start: 2023-07-10 | End: 2023-07-11

## 2023-07-10 RX ORDER — TAMSULOSIN HYDROCHLORIDE 0.4 MG/1
0.4 CAPSULE ORAL AT BEDTIME
Refills: 0 | Status: DISCONTINUED | OUTPATIENT
Start: 2023-07-10 | End: 2023-07-11

## 2023-07-10 RX ORDER — ONDANSETRON 8 MG/1
4 TABLET, FILM COATED ORAL EVERY 8 HOURS
Refills: 0 | Status: DISCONTINUED | OUTPATIENT
Start: 2023-07-10 | End: 2023-07-11

## 2023-07-10 RX ORDER — ONDANSETRON 8 MG/1
4 TABLET, FILM COATED ORAL EVERY 8 HOURS
Refills: 0 | Status: DISCONTINUED | OUTPATIENT
Start: 2023-07-10 | End: 2023-07-10

## 2023-07-10 RX ORDER — SACUBITRIL AND VALSARTAN 24; 26 MG/1; MG/1
1 TABLET, FILM COATED ORAL
Qty: 0 | Refills: 0 | DISCHARGE

## 2023-07-10 RX ORDER — FUROSEMIDE 40 MG
20 TABLET ORAL DAILY
Refills: 0 | Status: DISCONTINUED | OUTPATIENT
Start: 2023-07-11 | End: 2023-07-11

## 2023-07-10 RX ORDER — SACUBITRIL AND VALSARTAN 24; 26 MG/1; MG/1
1 TABLET, FILM COATED ORAL
Refills: 0 | Status: DISCONTINUED | OUTPATIENT
Start: 2023-07-10 | End: 2023-07-11

## 2023-07-10 RX ORDER — SUCRALFATE 1 G
1 TABLET ORAL
Refills: 0 | Status: DISCONTINUED | OUTPATIENT
Start: 2023-07-10 | End: 2023-07-11

## 2023-07-10 RX ORDER — SACUBITRIL AND VALSARTAN 24; 26 MG/1; MG/1
1 TABLET, FILM COATED ORAL
Refills: 0 | DISCHARGE

## 2023-07-10 RX ORDER — PANTOPRAZOLE SODIUM 20 MG/1
40 TABLET, DELAYED RELEASE ORAL
Refills: 0 | Status: DISCONTINUED | OUTPATIENT
Start: 2023-07-10 | End: 2023-07-11

## 2023-07-10 RX ADMIN — APIXABAN 5 MILLIGRAM(S): 2.5 TABLET, FILM COATED ORAL at 17:24

## 2023-07-10 RX ADMIN — Medication 81 MILLIGRAM(S): at 14:37

## 2023-07-10 RX ADMIN — SACUBITRIL AND VALSARTAN 1 TABLET(S): 24; 26 TABLET, FILM COATED ORAL at 18:30

## 2023-07-10 RX ADMIN — Medication 20 MILLIGRAM(S): at 17:24

## 2023-07-10 RX ADMIN — Medication 1 GRAM(S): at 18:30

## 2023-07-10 RX ADMIN — PANTOPRAZOLE SODIUM 40 MILLIGRAM(S): 20 TABLET, DELAYED RELEASE ORAL at 17:24

## 2023-07-10 RX ADMIN — ATORVASTATIN CALCIUM 10 MILLIGRAM(S): 80 TABLET, FILM COATED ORAL at 22:23

## 2023-07-10 NOTE — DISCHARGE NOTE PROVIDER - NSDCFUADDINST_GEN_ALL_CORE_FT
Follow up with Dr. Liz in one months time. The office will call you with an appointment in 3-5 days.

## 2023-07-10 NOTE — DISCHARGE NOTE PROVIDER - NSDCFUADDAPPT_GEN_ALL_CORE_FT
Our office will contact you in 3-5 days to schedule this appointment. Please call [phone] with questions or concerns.

## 2023-07-10 NOTE — DISCHARGE NOTE PROVIDER - CARE PROVIDER_API CALL
Gemini Norton  Cardiology  [address], [address]  [place] [address]  Phone: [phone]  Fax: [phone]  Follow Up Time:     Tru Liz  Cardiac Electrophysiology  [address] [place] [id number]  Phone: [phone]  Fax: [phone]  Follow Up Time:

## 2023-07-10 NOTE — PROGRESS NOTE ADULT - SUBJECTIVE AND OBJECTIVE BOX
PROCEDURE(S): Radiofrequency Ablation of Atrial Fibrillation    ELECTROPHYSIOLOGIST(S): Tru Liz MD         COMPLICATIONS:  none        DISPOSITION:  observation     CONDITION: stable  EBL: <15mL    Pt doing well s/p atrial fibrillation ablation ([facility]/PVI, CTI) via b/l [facility] access. Denies complaint.       MEDICATIONS  (STANDING):  apixaban 5 milliGRAM(s) Oral <User Schedule>  aspirin enteric coated 81 milliGRAM(s) Oral daily  atorvastatin 10 milliGRAM(s) Oral at bedtime  furosemide   Injectable 20 milliGRAM(s) IV Push once  metoprolol succinate ER 25 milliGRAM(s) Oral daily  pantoprazole    Tablet 40 milliGRAM(s) Oral two times a day  sucralfate suspension 1 Gram(s) Oral two times a day  tamsulosin 0.4 milliGRAM(s) Oral at bedtime    MEDICATIONS  (PRN):  acetaminophen     Tablet .. 650 milliGRAM(s) Oral every 6 hours PRN Mild Pain (1 - 3)  ALPRAZolam 0.25 milliGRAM(s) Oral every 6 hours PRN anxiety/ insomnia  aluminum hydroxide/magnesium hydroxide/simethicone Suspension 30 milliLiter(s) Oral every 4 hours PRN Dyspepsia  benzocaine/menthol Lozenge 1 Lozenge Oral every 2 hours PRN Sore Throat  ondansetron Injectable 4 milliGRAM(s) IV Push every 8 hours PRN Nausea and/or Vomiting      Allergies  No Known Allergies    Exam:   HR: 60  BP: 118/62  RR: 16  SpO2: 96% RA    Gen: VSS, NAD, A&O x 3  Card: S1/S2, RRR, no m/g/r  Resp: lungs CTA b/l  Abd: S/NT/ND  Groins: hemostatic sutures in place; sites C/D/I b/l; no bleeding, hematoma, erythema, exudate or edema  Ext: no edema; distal pulses intact  Neuro: CN II-XII grossly intact, REDD x4 with strength and sensation intact and equal bilaterally    I/Os: net + 2127    ECG: Sinus @ 59bpm, [place] 190ms, LBBB    Assessment:   67yo male with PMH of kidney stones s/p recent urethral procedures (March and June), LBBB, persistent atrial fibrillation s/p DCCV (12/2022) and HFimpEF (EF 32% 12/2022 improved to 45-50% 4/2023.    In 11/2022 he was found to be in AF during routine PMD evaluation. He had not previously been seen for a couple years, but ECG in 2/2020 was last known sinus rhythm. On ECG 11/23/22 he was in AF with LBBB at 116 bpm. A TTE revealed LVEF 32%. He denied associated symptoms. A 2 week event monitor from 12/9 to 12/23/22 revealed persistent AF with average rate 97 bpm (range  bpm). A cardiac CT revealed nonsignificant LAD stenosis. A DCCV was performed on 12/28/22, and amiodarone was started. He has also been on Eliquis and metoprolol, as well as GDMT for CHF with Entresto. A repeat echo in April revealed significantly improved LVEF (45-50%). He presented electively on 7/10/2023 for and is now status post uncomplicated radiofrequency ablation of atrial fibrillation.      Plan:   Bedrest x 4 hours, then OOB with assistance and progress as tolerated.   Groin sutures to be removed by EP service in AM.   Pending groin status: 5mg PO Eliquis to resume at 15:00 tonight.   DO NOT HOLD, INTERRUPT OR REVERSE ANTICOAGULATION WITHOUT EXPLICIT APPROVAL FROM EP SERVICE.   Lasix 20mg IV x 1 dose once ambulating, then Lasix 20mg PO daily x 3 days.   Continue Metoprolol.   Discontinue Multaq.  Start Protonix 40mg twice daily x 2 weeks, then once daily x 6 weeks.   Start Carafate 1gm BID x 2 weeks.   Continue other home medications.   Strict I/Os.  Please encourage incentive spirometry and ambulation once able.  Observation and monitoring on telemetry overnight with anticipated discharge in the AM and outpt follow up in 2-4 weeks.  PROCEDURE(S): Radiofrequency Ablation of Atrial Fibrillation    ELECTROPHYSIOLOGIST(S): Tru Liz MD         COMPLICATIONS:  none        DISPOSITION:  observation     CONDITION: stable  EBL: <15mL    Pt doing well s/p atrial fibrillation ablation ([facility]/PVI, CTI) via b/l [facility] access. HV interval=69 ms. No intraHisian conduction block was observed. Denies complaint.       MEDICATIONS  (STANDING):  apixaban 5 milliGRAM(s) Oral <User Schedule>  aspirin enteric coated 81 milliGRAM(s) Oral daily  atorvastatin 10 milliGRAM(s) Oral at bedtime  furosemide   Injectable 20 milliGRAM(s) IV Push once  metoprolol succinate ER 25 milliGRAM(s) Oral daily  pantoprazole    Tablet 40 milliGRAM(s) Oral two times a day  sucralfate suspension 1 Gram(s) Oral two times a day  tamsulosin 0.4 milliGRAM(s) Oral at bedtime    MEDICATIONS  (PRN):  acetaminophen     Tablet .. 650 milliGRAM(s) Oral every 6 hours PRN Mild Pain (1 - 3)  ALPRAZolam 0.25 milliGRAM(s) Oral every 6 hours PRN anxiety/ insomnia  aluminum hydroxide/magnesium hydroxide/simethicone Suspension 30 milliLiter(s) Oral every 4 hours PRN Dyspepsia  benzocaine/menthol Lozenge 1 Lozenge Oral every 2 hours PRN Sore Throat  ondansetron Injectable 4 milliGRAM(s) IV Push every 8 hours PRN Nausea and/or Vomiting      Allergies  No Known Allergies    Exam:   HR: 60  BP: 118/62  RR: 16  SpO2: 96% RA    Gen: VSS, NAD, A&O x 3  Card: S1/S2, RRR, no m/g/r  Resp: lungs CTA b/l  Abd: S/NT/ND  Groins: hemostatic sutures in place; sites C/D/I b/l; no bleeding, hematoma, erythema, exudate or edema  Ext: no edema; distal pulses intact  Neuro: CN II-XII grossly intact, REDD x4 with strength and sensation intact and equal bilaterally    I/Os: net + 2127    ECG: Sinus @ 59bpm, [place] 190ms, LBBB    Assessment:   67yo male with PMH of kidney stones s/p recent urethral procedures (March and June), LBBB, possible TIA, and persistent atrial fibrillation s/p DCCV (12/2022) with associated cardiomyopathy (EF 32% 12/2022 improved to 45-50% 4/2023).    He was found to be in AF in 11/2022 which was persistent with elevated ventricular rates. TTE in this setting revealed LVEF 32%, and cath with nonobstructive CAD. He underwent a MUNIR guided DCCV and was started on antiarrhythmic medication, initially with amiodarone and then Multaq, and has mostly since remained in sinus rhythm. Medical therapy has been limited by sinus bradycardia, as well as thyroid dysfunction on amiodarone.  A repeat echo in April revealed significantly improved LVEF (45-50%). He presented electively on 7/10/2023 for and is now status post uncomplicated radiofrequency ablation of atrial fibrillation.      Plan:   Bedrest x 4 hours, then OOB with assistance and progress as tolerated.   Groin sutures to be removed by EP service in AM.   Pending groin status: 5mg PO Eliquis to resume at 15:00 tonight.   DO NOT HOLD, INTERRUPT OR REVERSE ANTICOAGULATION WITHOUT EXPLICIT APPROVAL FROM EP SERVICE.   Lasix 20mg IV x 1 dose once ambulating, then Lasix 20mg PO daily x 3 days.   Continue Metoprolol.   Discontinue Multaq.  Start Protonix 40mg twice daily x 2 weeks, then once daily x 6 weeks.   Start Carafate 1gm BID x 2 weeks.   Continue other home medications.   Strict I/Os.  Please encourage incentive spirometry and ambulation once able.  Observation and monitoring on telemetry overnight with anticipated discharge in the AM and outpt follow up in 2-4 weeks.  PROCEDURE(S): Radiofrequency Ablation of Atrial Fibrillation    ELECTROPHYSIOLOGIST(S): Tru Liz MD         COMPLICATIONS:  none        DISPOSITION:  observation     CONDITION: stable  EBL: <15mL    Pt doing well s/p atrial fibrillation ablation ([facility]/PVI, CTI) via b/l [facility] access. HV interval=69 ms. No intraHisian conduction block was observed. Denies complaint.       MEDICATIONS  (STANDING):  apixaban 5 milliGRAM(s) Oral <User Schedule>  aspirin enteric coated 81 milliGRAM(s) Oral daily  atorvastatin 10 milliGRAM(s) Oral at bedtime  furosemide   Injectable 20 milliGRAM(s) IV Push once  metoprolol succinate ER 25 milliGRAM(s) Oral daily  pantoprazole    Tablet 40 milliGRAM(s) Oral two times a day  sucralfate suspension 1 Gram(s) Oral two times a day  tamsulosin 0.4 milliGRAM(s) Oral at bedtime    MEDICATIONS  (PRN):  acetaminophen     Tablet .. 650 milliGRAM(s) Oral every 6 hours PRN Mild Pain (1 - 3)  ALPRAZolam 0.25 milliGRAM(s) Oral every 6 hours PRN anxiety/ insomnia  aluminum hydroxide/magnesium hydroxide/simethicone Suspension 30 milliLiter(s) Oral every 4 hours PRN Dyspepsia  benzocaine/menthol Lozenge 1 Lozenge Oral every 2 hours PRN Sore Throat  ondansetron Injectable 4 milliGRAM(s) IV Push every 8 hours PRN Nausea and/or Vomiting      Allergies  No Known Allergies    Exam:   HR: 60  BP: 118/62  RR: 16  SpO2: 96% RA    Gen: VSS, NAD, A&O x 3  Card: S1/S2, RRR, no m/g/r  Resp: lungs CTA b/l  Abd: S/NT/ND  Groins: hemostatic sutures in place; sites C/D/I b/l; no bleeding, hematoma, erythema, exudate or edema  Ext: no edema; distal pulses intact  Neuro: CN II-XII grossly intact, REDD x4 with strength and sensation intact and equal bilaterally    I/Os: net + 2127    ECG: Sinus @ 59bpm, [place] 190ms, LBBB    Assessment:   67yo male with PMH of kidney stones s/p recent urethral procedures (March and June), LBBB, possible TIA, and persistent atrial fibrillation s/p DCCV (12/2022) with associated cardiomyopathy (EF 32% 12/2022 improved to 45-50% 4/2023).    He was found to be in AF in 11/2022 which was persistent with elevated ventricular rates. TTE in this setting revealed LVEF 32%, and cath with nonobstructive CAD. He underwent a MUNIR guided DCCV and was started on antiarrhythmic medication, initially with amiodarone and then Multaq, and has mostly since remained in sinus rhythm. Medical therapy has been limited by sinus bradycardia, as well as thyroid dysfunction on amiodarone.  A repeat echo in April revealed significantly improved LVEF (45-50%). He presented electively on 7/10/2023 for and is now status post uncomplicated radiofrequency ablation of atrial fibrillation. HV interval=69 ms. No intraHisian conduction block was observed.       Plan:   Patient noted with some hematuria with voiding. Pt with recent urethral stent and has some baseline hematuria. Will continue to monitor.   Bedrest x 4 hours, then OOB with assistance and progress as tolerated.   Groin sutures to be removed by EP service in AM.   Pending groin status: 5mg PO Eliquis to resume at 15:00 tonight.   DO NOT HOLD, INTERRUPT OR REVERSE ANTICOAGULATION WITHOUT EXPLICIT APPROVAL FROM EP SERVICE.   Lasix 20mg IV x 1 dose once ambulating, then Lasix 20mg PO daily x 3 days.   Continue Metoprolol.   Discontinue Multaq.  Start Protonix 40mg twice daily x 2 weeks, then once daily x 6 weeks.   Start Carafate 1gm BID x 2 weeks.   Continue other home medications.   Strict I/Os.  Please encourage incentive spirometry and ambulation once able.  Observation and monitoring on telemetry overnight with anticipated discharge in the AM and outpt follow up in 2-4 weeks.

## 2023-07-10 NOTE — DISCHARGE NOTE PROVIDER - NSDCFUSCHEDAPPT_GEN_ALL_CORE_FT
[place]  [facility]  UROLOGY [address]  Scheduled Appointment: 07/17/2023    Mike Kline  [facility]  INTMED [address]  Scheduled Appointment: 08/01/2023    Gemini Nroton  [facility]  CARDIOLOGY [address]  Scheduled Appointment: 09/12/2023

## 2023-07-10 NOTE — DISCHARGE NOTE PROVIDER - HOSPITAL COURSE
67yo male with PMH of kidney stones s/p recent urethral procedures (March and June), LBBB, possible TIA, and persistent atrial fibrillation s/p DCCV (12/2022) with associated cardiomyopathy (EF 32% 12/2022 improved to 45-50% 4/2023). He was found to be in AF in 11/2022 which was persistent with elevated ventricular rates. TTE in this setting revealed LVEF 32%, and cath with nonobstructive CAD. He underwent a MUNIR guided DCCV and was started on antiarrhythmic medication, initially with amiodarone and then Multaq, and has mostly since remained in sinus rhythm. Medical therapy has been limited by sinus bradycardia, as well as thyroid dysfunction on amiodarone.  A repeat echo in April revealed significantly improved LVEF (45-50%). He presented electively on 7/10/2023 for and is now status post uncomplicated radiofrequency ablation of atrial fibrillation. HV interval=69 ms. No intra Hisian conduction block was observed.

## 2023-07-10 NOTE — DISCHARGE NOTE PROVIDER - NSDCMRMEDTOKEN_GEN_ALL_CORE_FT
atorvastatin 10 mg oral tablet: 1 tab(s) orally once a day  Eliquis 5 mg oral tablet: 1 tab(s) orally 2 times a day  Low Dose ASA 81 mg oral tablet: 1 tab(s) orally once a day  Metoprolol Succinate ER 25 mg oral tablet, extended release: 1 orally once a day  Multaq 400 mg oral tablet: 1 tab(s) orally 2 times a day  tamsulosin 0.4 mg oral capsule: 1 cap(s) orally once a day   atorvastatin 10 mg oral tablet: 1 tab(s) orally once a day  Eliquis 5 mg oral tablet: 1 tab(s) orally 2 times a day  furosemide 20 mg oral tablet: 1 tab(s) orally once a day x 3 days  Low Dose ASA 81 mg oral tablet: 1 tab(s) orally once a day  Metoprolol Succinate ER 25 mg oral tablet, extended release: 1 orally once a day  pantoprazole 40 mg oral delayed release tablet: 1 tab(s) orally 2 times a day x 14 days followed by 1 tab orally daily x 30 days  sacubitril-valsartan 24 mg-26 mg oral tablet: 1 orally 2 times a day  sucralfate 1 g/10 mL oral suspension: 10 milliliter(s) orally 2 times a day x 14 days

## 2023-07-10 NOTE — PROGRESS NOTE ADULT - SUBJECTIVE AND OBJECTIVE BOX
Pt arrived for scheduled AF ablation with Dr. Liz. PST performed on 7/3/2023. Labs reviewed. NPO confirmed. Last Eliquis dose *** Last farxiga dose ***      This is a 69yo male with PMH of cardiomyopathy and LBBB, who was recently diagnosed with new onset persistent atrial fibrillation s/p DCCV,    In 2022 he was found to be in AF during routine PMD evaluation. He had not previously been seen for a couple years, but ECG in 2020 was last known sinus rhythm. On ECG 22 he was in AF with LBBB at 116 bpm. A TTE revealed LVEF 32%. He denied associated symptoms. He does drink about 5 beers per week. A 2 week event monitor from  to 22 revealed persistent AF with average rate 97 bpm (range  bpm). A cardiac CT revealed nonsignificant LAD stenosis. DCCV was performed on 22, and amiodarone was started. He has also been on Eliquis and metoprolol, as well as GDMT for CHF with Entresto. He reports feeling well and denies any palpitation, dizziness, dyspnea, chest pain, or syncope. He now presents electively for a MUNIR and AF ablation.        Cardiology Summary  EC23 sinus rhythm 55 bpm, LBBB, first degree AVB  ms, +repolarization changes          22 atrial fibrillation : LBBB. Hear rate 116 bpm.   Echo: TTE 22 LVEF 32%, mildly enlarged LA, RV function is mildly reduced, mod-severe MR  Pt arrived for scheduled AF ablation with Dr. Liz. PST performed on 7/3/2023. Labs reviewed. NPO confirmed. Last Eliquis dose yesterday evening.       This is a 67yo male with PMH of cardiomyopathy and LBBB, who was recently diagnosed with new onset persistent atrial fibrillation s/p DCCV,    In 2022 he was found to be in AF during routine PMD evaluation. He had not previously been seen for a couple years, but ECG in 2020 was last known sinus rhythm. On ECG 22 he was in AF with LBBB at 116 bpm. A TTE revealed LVEF 32%. He denied associated symptoms. He does drink about 5 beers per week. A 2 week event monitor from  to 22 revealed persistent AF with average rate 97 bpm (range  bpm). A cardiac CT revealed nonsignificant LAD stenosis. DCCV was performed on 22, and amiodarone was started. He has also been on Eliquis and metoprolol, as well as GDMT for CHF with Entresto. A repeat echo in April revealed significantly improved LVEF (45-50%). He reports feeling well and denies any palpitation, dizziness, dyspnea, chest pain, or syncope. He now presents electively for a MUNIR and AF ablation.        Cardiology Summary  EC23 sinus rhythm 55 bpm, LBBB, first degree AVB  ms, +repolarization changes          22 atrial fibrillation : LBBB. Hear rate 116 bpm.   Echo: TTE 22 LVEF 32%, mildly enlarged LA, RV function is mildly reduced, mod-severe MR                   4/[id number]; LVEF 45-50%. Moderate MR. Pt arrived for scheduled AF ablation with Dr. Liz. PST performed on 7/3/2023, denies any interval changes. Labs reviewed. NPO confirmed. Last Eliquis dose yesterday evening.     MUNIR deferred. Pt currently in sinus rhythm and has been on uninterrupted ac for > 3 weeks. BARBARA to be cleared with ICE.     This is a 69yo male with PMH of kidney stones s/p recent urethral procedures (March and ), LBBB, persistent atrial fibrillation s/p DCCV (2022) and HFimpEF (EF 32% 2022 improved to 45-50% 2023.    In 2022 he was found to be in AF during routine PMD evaluation. He had not previously been seen for a couple years, but ECG in 2020 was last known sinus rhythm. On ECG 22 he was in AF with LBBB at 116 bpm. A TTE revealed LVEF 32%. He denied associated symptoms. A 2 week event monitor from  to 22 revealed persistent AF with average rate 97 bpm (range  bpm). A cardiac CT revealed nonsignificant LAD stenosis. A DCCV was performed on 22, and amiodarone was started. He has also been on Eliquis and metoprolol, as well as GDMT for CHF with Entresto. A repeat echo in April revealed significantly improved LVEF (45-50%). He now presents electively for an AF ablation.        Cardiology Summary  EC23 sinus rhythm 55 bpm, LBBB, first degree AVB  ms, +repolarization changes          22 atrial fibrillation : LBBB. Hear rate 116 bpm.   Echo: TTE 22 LVEF 32%, mildly enlarged LA, RV function is mildly reduced, mod-severe MR                   /[id number]; LVEF 45-50%. Moderate MR.

## 2023-07-11 ENCOUNTER — TRANSCRIPTION ENCOUNTER (OUTPATIENT)
Age: 68
End: 2023-07-11

## 2023-07-11 ENCOUNTER — NON-APPOINTMENT (OUTPATIENT)
Age: 68
End: 2023-07-11

## 2023-07-11 VITALS — HEART RATE: 71 BPM | RESPIRATION RATE: 16 BRPM | DIASTOLIC BLOOD PRESSURE: 59 MMHG | SYSTOLIC BLOOD PRESSURE: 102 MMHG

## 2023-07-11 LAB
ANION GAP SERPL CALC-SCNC: 11 MMOL/L — SIGNIFICANT CHANGE UP (ref 5–17)
BUN SERPL-MCNC: 13.1 MG/DL — SIGNIFICANT CHANGE UP (ref 8–20)
CALCIUM SERPL-MCNC: 8.6 MG/DL — SIGNIFICANT CHANGE UP (ref 8.4–10.5)
CHLORIDE SERPL-SCNC: 102 MMOL/L — SIGNIFICANT CHANGE UP (ref 96–108)
CO2 SERPL-SCNC: 24 MMOL/L — SIGNIFICANT CHANGE UP (ref 22–29)
CREAT SERPL-MCNC: 1.08 MG/DL — SIGNIFICANT CHANGE UP (ref 0.5–1.3)
EGFR: 76 ML/MIN/1.73M2 — SIGNIFICANT CHANGE UP
GLUCOSE SERPL-MCNC: 144 MG/DL — HIGH (ref 70–99)
HCT VFR BLD CALC: 36.8 % — LOW (ref 39–50)
HGB BLD-MCNC: 12.3 G/DL — LOW (ref 13–17)
MAGNESIUM SERPL-MCNC: 1.9 MG/DL — SIGNIFICANT CHANGE UP (ref 1.6–2.6)
MCHC RBC-ENTMCNC: 30.8 PG — SIGNIFICANT CHANGE UP (ref 27–34)
MCHC RBC-ENTMCNC: 33.4 GM/DL — SIGNIFICANT CHANGE UP (ref 32–36)
MCV RBC AUTO: 92.2 FL — SIGNIFICANT CHANGE UP (ref 80–100)
PLATELET # BLD AUTO: 267 K/UL — SIGNIFICANT CHANGE UP (ref 150–400)
POTASSIUM SERPL-MCNC: 4.8 MMOL/L — SIGNIFICANT CHANGE UP (ref 3.5–5.3)
POTASSIUM SERPL-SCNC: 4.8 MMOL/L — SIGNIFICANT CHANGE UP (ref 3.5–5.3)
RBC # BLD: 3.99 M/UL — LOW (ref 4.2–5.8)
RBC # FLD: 13.8 % — SIGNIFICANT CHANGE UP (ref 10.3–14.5)
SODIUM SERPL-SCNC: 137 MMOL/L — SIGNIFICANT CHANGE UP (ref 135–145)
WBC # BLD: 7.69 K/UL — SIGNIFICANT CHANGE UP (ref 3.8–10.5)
WBC # FLD AUTO: 7.69 K/UL — SIGNIFICANT CHANGE UP (ref 3.8–10.5)

## 2023-07-11 PROCEDURE — 80048 BASIC METABOLIC PNL TOTAL CA: CPT

## 2023-07-11 PROCEDURE — 83735 ASSAY OF MAGNESIUM: CPT

## 2023-07-11 PROCEDURE — 93656 COMPRE EP EVAL ABLTJ ATR FIB: CPT

## 2023-07-11 PROCEDURE — C1732: CPT

## 2023-07-11 PROCEDURE — 85027 COMPLETE CBC AUTOMATED: CPT

## 2023-07-11 PROCEDURE — C1759: CPT

## 2023-07-11 PROCEDURE — C1889: CPT

## 2023-07-11 PROCEDURE — 93010 ELECTROCARDIOGRAM REPORT: CPT

## 2023-07-11 PROCEDURE — C1731: CPT

## 2023-07-11 PROCEDURE — C1766: CPT

## 2023-07-11 PROCEDURE — 93005 ELECTROCARDIOGRAM TRACING: CPT

## 2023-07-11 PROCEDURE — C1894: CPT

## 2023-07-11 PROCEDURE — 93655 ICAR CATH ABLTJ DSCRT ARRHYT: CPT

## 2023-07-11 PROCEDURE — 36415 COLL VENOUS BLD VENIPUNCTURE: CPT

## 2023-07-11 RX ORDER — SUCRALFATE 1 G
10 TABLET ORAL
Qty: 280 | Refills: 0
Start: 2023-07-11 | End: 2023-07-24

## 2023-07-11 RX ORDER — FUROSEMIDE 40 MG
1 TABLET ORAL
Qty: 3 | Refills: 0
Start: 2023-07-11 | End: 2023-07-13

## 2023-07-11 RX ORDER — PANTOPRAZOLE SODIUM 20 MG/1
1 TABLET, DELAYED RELEASE ORAL
Qty: 58 | Refills: 0
Start: 2023-07-11 | End: 2023-07-24

## 2023-07-11 RX ADMIN — Medication 1 GRAM(S): at 05:36

## 2023-07-11 RX ADMIN — Medication 20 MILLIGRAM(S): at 05:35

## 2023-07-11 RX ADMIN — Medication 25 MILLIGRAM(S): at 05:35

## 2023-07-11 RX ADMIN — PANTOPRAZOLE SODIUM 40 MILLIGRAM(S): 20 TABLET, DELAYED RELEASE ORAL at 05:35

## 2023-07-11 RX ADMIN — APIXABAN 5 MILLIGRAM(S): 2.5 TABLET, FILM COATED ORAL at 05:34

## 2023-07-11 RX ADMIN — SACUBITRIL AND VALSARTAN 1 TABLET(S): 24; 26 TABLET, FILM COATED ORAL at 05:35

## 2023-07-11 NOTE — PROGRESS NOTE ADULT - SUBJECTIVE AND OBJECTIVE BOX
Patient seen today in bed. Figure 8 sutures removed from right and left groin without complication. No overnight events.     EKG: SR at 70bpm; QRSD 150ms; LBBB  TELE: SR no events.     MEDICATIONS  (STANDING):  apixaban 5 milliGRAM(s) Oral <User Schedule>  aspirin enteric coated 81 milliGRAM(s) Oral daily  atorvastatin 10 milliGRAM(s) Oral at bedtime  furosemide    Tablet 20 milliGRAM(s) Oral daily  metoprolol succinate ER 25 milliGRAM(s) Oral daily  pantoprazole    Tablet 40 milliGRAM(s) Oral two times a day  sacubitril 24 mG/valsartan 26 mG 1 Tablet(s) Oral two times a day  sucralfate suspension 1 Gram(s) Oral two times a day  tamsulosin 0.4 milliGRAM(s) Oral at bedtime    MEDICATIONS  (PRN):  acetaminophen     Tablet .. 650 milliGRAM(s) Oral every 6 hours PRN Mild Pain (1 - 3)  ALPRAZolam 0.25 milliGRAM(s) Oral every 6 hours PRN anxiety/ insomnia  aluminum hydroxide/magnesium hydroxide/simethicone Suspension 30 milliLiter(s) Oral every 4 hours PRN Dyspepsia  benzocaine/menthol Lozenge 1 Lozenge Oral every 2 hours PRN Sore Throat  ondansetron Injectable 4 milliGRAM(s) IV Push every 8 hours PRN Nausea and/or Vomiting    Allergies  No Known Allergies    PAST MEDICAL & SURGICAL HISTORY:  Hypertension  CHF (congestive heart failure)  HLD (hyperlipidemia)  Prediabetes  LBBB (left bundle branch block)  Nephrolithiasis  Paroxysmal atrial fibrillation  H/O cystoscopy  H/O ureteroscopy  H/O colonoscopy    Vital Signs Last 24 Hrs  T(C): 36.6 (11 Jul 2023 05:25), Max: 36.6 (11 Jul 2023 05:25)  T(F): 97.8 (11 Jul 2023 05:25), Max: 97.8 (11 Jul 2023 05:25)  HR: 66 (11 Jul 2023 05:25) (51 - 66)  BP: 115/59 (11 Jul 2023 05:25) (94/57 - 125/60)  RR: 16 (11 Jul 2023 05:25) (14 - 20)  SpO2: 95% (11 Jul 2023 05:25) (93% - 99%)    Physical Exam:  Constitutional: NAD, AAOx3  Cardiovascular: +S1S2 RRR  Pulmonary: CTA b/l, unlabored  GI: soft NTND +BS  Extremities: no pedal edema,  Right and left groin: No hematoma or ecchymosis.   Neuro: non focal, REDD x4    LABS:                        12.3   7.69  )-----------( 267      ( 11 Jul 2023 05:24 )             36.8     137  |  102  |  13.1  ----------------------------<  144<H>  4.8   |  24.0  |  1.08  Ca    8.6      11 Jul 2023 05:24  Mg     1.9     07-11    Urinalysis Basic - ( 11 Jul 2023 05:24 )  Color: x / Appearance: x / SG: x / pH: x  Gluc: 144 mg/dL / Ketone: x  / Bili: x / Urobili: x   Blood: x / Protein: x / Nitrite: x   Leuk Esterase: x / RBC: x / WBC x   Sq Epi: x / Non Sq Epi: x / Bacteria: x    A/P  68 year old female patient with a history of kidney stones s/p recent urethral procedures (March and June), LBBB, possible TIA, and persistent atrial fibrillation s/p DCCV (12/2022) with associated cardiomyopathy (EF 32% 12/2022 improved to 45-50% 4/2023). He is now POD#1 status post uncomplicated radiofrequency ablation of atrial fibrillation. HV interval=69 ms. No infra-Hisian conduction block was observed.     - Discharge home today.

## 2023-07-11 NOTE — DISCHARGE NOTE NURSING/CASE MANAGEMENT/SOCIAL WORK - NSDCFUADDAPPT_GEN_ALL_CORE_FT
Our office will contact you in 3-5 days to schedule this appointment. Please call [phone] with questions or concerns.

## 2023-07-11 NOTE — DISCHARGE NOTE NURSING/CASE MANAGEMENT/SOCIAL WORK - PATIENT PORTAL LINK FT
You can access the FollowMyHealth Patient Portal offered by [facility] by registering at the following website: [email or web address]. By joining [facility]’s FollowMyHealth portal, you will also be able to view your health information using other applications (apps) compatible with our system.

## 2023-07-11 NOTE — DISCHARGE NOTE NURSING/CASE MANAGEMENT/SOCIAL WORK - NSDCPEFALRISK_GEN_ALL_CORE
For information on Fall & Injury Prevention, visit: [email or web address] OR  [email or web address] OR  https://www.cdc.gov/steadi/patient.html

## 2023-07-12 ENCOUNTER — NON-APPOINTMENT (OUTPATIENT)
Age: 68
End: 2023-07-12

## 2023-07-13 ENCOUNTER — APPOINTMENT (OUTPATIENT)
Dept: ELECTROPHYSIOLOGY | Facility: CLINIC | Age: 68
End: 2023-07-13

## 2023-07-17 ENCOUNTER — APPOINTMENT (OUTPATIENT)
Dept: UROLOGY | Facility: CLINIC | Age: 68
End: 2023-07-17
Payer: COMMERCIAL

## 2023-07-17 PROCEDURE — 99214 OFFICE O/P EST MOD 30 MIN: CPT

## 2023-07-18 ENCOUNTER — APPOINTMENT (OUTPATIENT)
Dept: INTERNAL MEDICINE | Facility: CLINIC | Age: 68
End: 2023-07-18

## 2023-07-19 NOTE — HISTORY OF PRESENT ILLNESS
[FreeTextEntry1] : s.p bilateral ureteroscopy for large stones\par see previous notes\par \par reviewed CT with patient 7/7/2023:\par stents in place\par bilateral small stones, the largest 3 mm on the left\par \par discussed options of either removal of the stents and follow up, or left ureteroscopy for the residual small stone.\par my recommendation is removal of the stents and follow up\par the stone mentioned is small, and has a high likelihood of passing spontaneously\par discussed chances of spontaneous stone passage based on size\par also discussed general recommendations for stone prevention and the need for metabolic evaluation\par \par \par plan:\par - cystoscopy and stent removal\par - will need metabolic evaluation after

## 2023-07-19 NOTE — ASSESSMENT
[FreeTextEntry1] : \par \par plan:\par - cystoscopy and stent removal\par - will need metabolic evaluation after

## 2023-07-24 ENCOUNTER — APPOINTMENT (OUTPATIENT)
Dept: UROLOGY | Facility: CLINIC | Age: 68
End: 2023-07-24
Payer: COMMERCIAL

## 2023-07-24 VITALS — HEART RATE: 50 BPM | DIASTOLIC BLOOD PRESSURE: 97 MMHG | SYSTOLIC BLOOD PRESSURE: 153 MMHG

## 2023-07-24 LAB
BILIRUB UR QL STRIP: ABNORMAL
CLARITY UR: CLEAR
COLLECTION METHOD: NORMAL
GLUCOSE UR-MCNC: NORMAL
HCG UR QL: 1 EU/DL
HGB UR QL STRIP.AUTO: ABNORMAL
KETONES UR-MCNC: ABNORMAL
LEUKOCYTE ESTERASE UR QL STRIP: ABNORMAL
NITRITE UR QL STRIP: POSITIVE
PH UR STRIP: 6
PROT UR STRIP-MCNC: ABNORMAL
SP GR UR STRIP: 1.02

## 2023-07-24 PROCEDURE — 99212 OFFICE O/P EST SF 10 MIN: CPT

## 2023-07-24 PROCEDURE — 81003 URINALYSIS AUTO W/O SCOPE: CPT | Mod: NC,QW

## 2023-07-24 NOTE — HISTORY OF PRESENT ILLNESS
[FreeTextEntry1] : \par was scheduled for cystoscopy and stent removal today\par urinalysis with positive nitrites\par discussed with patient\par sent urine for culture\par started abx\par will schedule stents removal next week

## 2023-07-27 LAB — BACTERIA UR CULT: NORMAL

## 2023-07-31 ENCOUNTER — APPOINTMENT (OUTPATIENT)
Dept: UROLOGY | Facility: CLINIC | Age: 68
End: 2023-07-31

## 2023-07-31 ENCOUNTER — APPOINTMENT (OUTPATIENT)
Dept: UROLOGY | Facility: CLINIC | Age: 68
End: 2023-07-31
Payer: COMMERCIAL

## 2023-07-31 VITALS — DIASTOLIC BLOOD PRESSURE: 75 MMHG | HEART RATE: 82 BPM | SYSTOLIC BLOOD PRESSURE: 115 MMHG

## 2023-07-31 PROCEDURE — 81003 URINALYSIS AUTO W/O SCOPE: CPT | Mod: NC,QW

## 2023-07-31 PROCEDURE — 52310 CYSTOSCOPY AND TREATMENT: CPT

## 2023-08-01 ENCOUNTER — APPOINTMENT (OUTPATIENT)
Dept: INTERNAL MEDICINE | Facility: CLINIC | Age: 68
End: 2023-08-01
Payer: COMMERCIAL

## 2023-08-01 ENCOUNTER — LABORATORY RESULT (OUTPATIENT)
Age: 68
End: 2023-08-01

## 2023-08-01 VITALS
DIASTOLIC BLOOD PRESSURE: 62 MMHG | SYSTOLIC BLOOD PRESSURE: 110 MMHG | WEIGHT: 185 LBS | HEIGHT: 72 IN | BODY MASS INDEX: 25.06 KG/M2

## 2023-08-01 DIAGNOSIS — K92.1 MELENA: ICD-10-CM

## 2023-08-01 PROCEDURE — 99215 OFFICE O/P EST HI 40 MIN: CPT | Mod: 25

## 2023-08-01 PROCEDURE — 36415 COLL VENOUS BLD VENIPUNCTURE: CPT

## 2023-08-01 RX ORDER — CEPHALEXIN 500 MG/1
500 CAPSULE ORAL
Qty: 6 | Refills: 0 | Status: COMPLETED | COMMUNITY
Start: 2023-06-13 | End: 2023-08-01

## 2023-08-01 RX ORDER — DRONEDARONE 400 MG/1
400 TABLET, FILM COATED ORAL TWICE DAILY
Qty: 180 | Refills: 1 | Status: DISCONTINUED | COMMUNITY
Start: 2023-04-20 | End: 2023-08-01

## 2023-08-01 NOTE — ASSESSMENT
[FreeTextEntry1] : Cholesterol levels discussed with patient. Compliance with oral medication were also addressed . Ideal LDL levels were  discussed with the patient. All issues regarding the implications of high cholesterol necessity for treatment were discussed with the patient who is conversant and all relevant questions were asked and answered.  LDL goal less than 70 with 40% on scan patient will need to stay on aspirin daily Patient's [id number] either 2 or 4 but either way high risk and will need to be on Eliquis lifelong Patient with history of mild elevation TSH on amiodarone we will recheck Patient prediabetes not on treatment and with low ejection fraction and coronary artery disease may be candidate for Jardiance.  We will recheck all labs and discuss This is an extended visit discussing all these issues.  Insert care summary

## 2023-08-01 NOTE — HISTORY OF PRESENT ILLNESS
[FreeTextEntry1] : Patient here for evaluation of multiple medical problems and new issues to be discussed [de-identified] : Patient's first visit in 3 years 67-year-old male who has no significant past medical history and is on no medications.  He came in for issues of concerns for mild memory loss with people's names.  He also had of interest an issue where he was unable to see 1 letter on the clock which resolved in 20 seconds.  He has no other neurological symptoms and is due to see an ophthalmologist  [id number] wellness afib with mairta cardiomyopathy Patient is here for wellness exam and discussion of medical problems.  At last visit November patient was in rapid atrial fibrillation.  He subsequently saw cardiology and was found to have a significantly low ejection fraction.  He had a cardioversion to normal rhythm apparently went back into A. fib and is seeing EPS. Patient not up-to-date with vaccines with tetanus pneumo and COVID that he was getting drugstore Patient denies any shortness of breath and is unaware of palpitations.  He is able to climb steps without difficulty.  He did have a coronary CAT scan where he has calcifications in his LAD and he is on Lipitor.  He otherwise offers no complaints  [id number] Patient doing very well.  Completed course of Cipro without difficulty and will recheck urine in future.  Patient asymptomatic but not aware of whether or not he is in A. fib was in normal sinus rhythm last visit.  Overall feeling well   [id number] Patient returns for routine visit.  He states he feels significantly better.  He recently saw cardiology and was in normal sinus rhythm and had elective visit to see Dr. Liz.  He does complain of intermittent hematuria without pain and is on Eliquis with normal PSA.  He has no urinary tract symptoms of infection  [id number] S/P afib ablation - NSR off Multaq an asa and eliquos- 40% LAD S/P removal renal stones bilat - asx  Ejection fraction improved since normal sinus rhythm.  Patient feels well no chest pain no shortness of breath up-to-date with vaccines.  Being followed closely by cardiology

## 2023-08-02 LAB
ALBUMIN SERPL ELPH-MCNC: 4.9 G/DL
ALP BLD-CCNC: 91 U/L
ALT SERPL-CCNC: 19 U/L
ANION GAP SERPL CALC-SCNC: 14 MMOL/L
AST SERPL-CCNC: 22 U/L
BILIRUB SERPL-MCNC: 0.5 MG/DL
BUN SERPL-MCNC: 18 MG/DL
CALCIUM SERPL-MCNC: 10 MG/DL
CHLORIDE SERPL-SCNC: 100 MMOL/L
CHOLEST SERPL-MCNC: 120 MG/DL
CO2 SERPL-SCNC: 25 MMOL/L
CREAT SERPL-MCNC: 0.88 MG/DL
EGFR: 95 ML/MIN/1.73M2
ESTIMATED AVERAGE GLUCOSE: 111 MG/DL
GLUCOSE SERPL-MCNC: 94 MG/DL
HBA1C MFR BLD HPLC: 5.5 %
HDLC SERPL-MCNC: 53 MG/DL
LDLC SERPL CALC-MCNC: 55 MG/DL
NONHDLC SERPL-MCNC: 66 MG/DL
POTASSIUM SERPL-SCNC: 4.7 MMOL/L
PROT SERPL-MCNC: 7.2 G/DL
SODIUM SERPL-SCNC: 140 MMOL/L
TRIGL SERPL-MCNC: 47 MG/DL
TSH SERPL-ACNC: 6.46 UIU/ML

## 2023-08-05 LAB — HEMOCCULT STL QL IA: NEGATIVE

## 2023-08-16 LAB — BACTERIA UR CULT: NORMAL

## 2023-08-31 ENCOUNTER — APPOINTMENT (OUTPATIENT)
Dept: ELECTROPHYSIOLOGY | Facility: CLINIC | Age: 68
End: 2023-08-31
Payer: COMMERCIAL

## 2023-08-31 ENCOUNTER — NON-APPOINTMENT (OUTPATIENT)
Age: 68
End: 2023-08-31

## 2023-08-31 VITALS
OXYGEN SATURATION: 98 % | SYSTOLIC BLOOD PRESSURE: 126 MMHG | RESPIRATION RATE: 16 BRPM | HEART RATE: 75 BPM | TEMPERATURE: 98.2 F | HEIGHT: 72 IN | WEIGHT: 180 LBS | BODY MASS INDEX: 24.38 KG/M2 | DIASTOLIC BLOOD PRESSURE: 78 MMHG

## 2023-08-31 PROCEDURE — 99214 OFFICE O/P EST MOD 30 MIN: CPT | Mod: 25

## 2023-08-31 PROCEDURE — 93000 ELECTROCARDIOGRAM COMPLETE: CPT

## 2023-08-31 RX ORDER — AMOXICILLIN AND CLAVULANATE POTASSIUM 875; 125 MG/1; MG/1
875-125 TABLET, COATED ORAL
Qty: 14 | Refills: 0 | Status: DISCONTINUED | COMMUNITY
Start: 2023-07-24 | End: 2023-08-31

## 2023-08-31 RX ORDER — PANTOPRAZOLE 40 MG/1
40 TABLET, DELAYED RELEASE ORAL
Qty: 58 | Refills: 0 | Status: DISCONTINUED | COMMUNITY
Start: 2023-07-11 | End: 2023-08-31

## 2023-08-31 NOTE — REVIEW OF SYSTEMS
[Feeling Fatigued] : not feeling fatigued [SOB] : no shortness of breath [Dyspnea on exertion] : not dyspnea during exertion [Chest Discomfort] : chest discomfort [Palpitations] : no palpitations [Orthopnea] : no orthopnea [Syncope] : no syncope [Dizziness] : no dizziness [Easy Bleeding] : no tendency for easy bleeding

## 2023-08-31 NOTE — DISCUSSION/SUMMARY
[FreeTextEntry1] : 68 year old gentleman with history of LBBB, possible TIA, and persistent atrial fibrillation with associated cardiomyopathy now s/p AF ablation 7/10/23 (PVI, CTI line) with Dr. Liz and presents for post ablation follow-up.   He was found to be in AF in 11/2022 which was persistent with elevated ventricular rates. TTE in this setting revealed LVEF 32%, and cath with nonobstructive CAD. He underwent MUNIR/DCCV and was started on antiarrhythmic medication, initially with amiodarone and then Multaq, and has mostly since remained in sinus rhythm. Medical therapy has been limited by sinus bradycardia, as well as thyroid dysfunction on amiodarone. Underwent AF ablation (PVI, CTI line) on 7/10/23. Of note baseline LBBB and HV interval=69 ms. No intraHisian conduction block was observed.   Multaq was stopped post ablation.   Today, reports he has been feeling well since procedure. Denies symptomatic arrhythmia recurrence. Today, ECG reveals SR with narrow QRS 94 ms. Occasionally feels very mild right sided chest discomfort when leaning forward. Denies pain while lying flat or when takes deep breath. Tolerating Eliquis without c/o easy bruising, bleeding, or falls.   Recommendation:   Mr. Grullon has been doing very well s/p ablation for persistent AF maintaining SR on ECG. Multaq discontinued post procedure. To repeat 1 week holter 3 months post ablation with repeat echo in 3 months during EP follow up to reassess EF. Continue GDMT per Dr. Norton. Continue ELiquis for stroke prophylaxis. In regards to mild right sided chest discomfort we discussed this is atypical for post procedure pericarditis. He will call us to report if symptoms do not self resolve within the next 1-2 weeks.In regards to upcoming dental work needed, OK to hold AC temporarily for procedures after 2 months post ablation ( 9/10/23 or later).   Sophia MALONEC [EKG obtained to assist in diagnosis and management of assessed problem(s)] : EKG obtained to assist in diagnosis and management of assessed problem(s)

## 2023-08-31 NOTE — HISTORY OF PRESENT ILLNESS
[FreeTextEntry1] : 68 year old gentleman with history of LBBB, possible TIA, and persistent atrial fibrillation with associated cardiomyopathy now s/p AF ablation 7/10/23 (PVI, CTI line) with Dr. Liz and presents for post ablation follow-up.   He was found to be in AF in 11/2022 which was persistent with elevated ventricular rates. TTE in this setting revealed LVEF 32%, and cath with nonobstructive CAD. He underwent MUNIR/DCCV and was started on antiarrhythmic medication, initially with amiodarone and then Multaq, and has mostly since remained in sinus rhythm. Medical therapy has been limited by sinus bradycardia, as well as thyroid dysfunction on amiodarone. Underwent AF ablation (PVI, CTI line) on 7/10/23. Of note baseline LBBB and HV interval=69 ms. No intraHisian conduction block was observed.   Multaq was stopped post ablation.   Today, reports he has been feeling well since procedure. Denies symptomatic arrhythmia recurrence. Today, ECG reveals SR with narrow QRS 94 ms. Occasionally feels very mild right sided chest discomfort when leaning forward. Denies pain while lying flat or when takes deep breath. Tolerating Eliquis without c/o easy bruising, bleeding, or falls.

## 2023-09-06 RX ORDER — METOPROLOL SUCCINATE 25 MG/1
25 TABLET, EXTENDED RELEASE ORAL
Qty: 90 | Refills: 3 | Status: ACTIVE | COMMUNITY
Start: 2023-05-17 | End: 1900-01-01

## 2023-09-11 ENCOUNTER — APPOINTMENT (OUTPATIENT)
Dept: UROLOGY | Facility: CLINIC | Age: 68
End: 2023-09-11
Payer: COMMERCIAL

## 2023-09-11 VITALS — HEART RATE: 74 BPM | DIASTOLIC BLOOD PRESSURE: 74 MMHG | SYSTOLIC BLOOD PRESSURE: 127 MMHG

## 2023-09-11 PROCEDURE — 99213 OFFICE O/P EST LOW 20 MIN: CPT

## 2023-09-12 ENCOUNTER — APPOINTMENT (OUTPATIENT)
Dept: CARDIOLOGY | Facility: CLINIC | Age: 68
End: 2023-09-12
Payer: COMMERCIAL

## 2023-09-12 VITALS
TEMPERATURE: 98.2 F | HEART RATE: 70 BPM | DIASTOLIC BLOOD PRESSURE: 56 MMHG | SYSTOLIC BLOOD PRESSURE: 90 MMHG | HEIGHT: 72 IN | WEIGHT: 186 LBS | BODY MASS INDEX: 25.19 KG/M2 | OXYGEN SATURATION: 98 %

## 2023-09-12 DIAGNOSIS — Z01.810 ENCOUNTER FOR PREPROCEDURAL CARDIOVASCULAR EXAMINATION: ICD-10-CM

## 2023-09-12 PROCEDURE — 99215 OFFICE O/P EST HI 40 MIN: CPT

## 2023-09-25 ENCOUNTER — APPOINTMENT (OUTPATIENT)
Dept: UROLOGY | Facility: CLINIC | Age: 68
End: 2023-09-25

## 2023-10-12 NOTE — H&P PST ADULT - NSANTHTOTALSCORECAL_ENT_A_CORE
3 Aklief Pregnancy And Lactation Text: It is unknown if this medication is safe to use during pregnancy.  It is unknown if this medication is excreted in breast milk.  Breastfeeding women should use the topical cream on the smallest area of the skin for the shortest time needed while breastfeeding.  Do not apply to nipple and areola.

## 2023-10-16 NOTE — PROGRESS NOTE ADULT - PROVIDER SPECIALTY LIST ADULT
----- Message from Anca Shearre MA sent at 10/13/2023 11:50 AM CDT -----  Name of Who is Calling:MORAIMA LUCAS [[id number]]                What is the request in detail: Pt needs the office to call opt RX mail order through [employer] to send them a rx so she would not have to pay for levETIRAcetam (KEPPRA) 750 MG Tab, they need to know when the next refill order is. Opt pharmacy line [phone]. Please assist.                Can the clinic reply by [facility]: No                What Number [facility] Call Back [facility]: [phone]     Electrophysiology

## 2023-10-20 RX ORDER — SACUBITRIL AND VALSARTAN 24; 26 MG/1; MG/1
24-26 TABLET, FILM COATED ORAL
Qty: 180 | Refills: 2 | Status: ACTIVE | COMMUNITY
Start: 2022-12-08 | End: 1900-01-01

## 2023-10-31 ENCOUNTER — APPOINTMENT (OUTPATIENT)
Dept: INTERNAL MEDICINE | Facility: CLINIC | Age: 68
End: 2023-10-31
Payer: COMMERCIAL

## 2023-10-31 VITALS
BODY MASS INDEX: 23.3 KG/M2 | HEIGHT: 72 IN | SYSTOLIC BLOOD PRESSURE: 110 MMHG | DIASTOLIC BLOOD PRESSURE: 70 MMHG | WEIGHT: 172 LBS

## 2023-10-31 DIAGNOSIS — R19.5 OTHER FECAL ABNORMALITIES: ICD-10-CM

## 2023-10-31 PROCEDURE — 99214 OFFICE O/P EST MOD 30 MIN: CPT | Mod: 25

## 2023-10-31 PROCEDURE — 36415 COLL VENOUS BLD VENIPUNCTURE: CPT

## 2023-11-01 LAB
ALBUMIN SERPL ELPH-MCNC: 5.1 G/DL
ALP BLD-CCNC: 91 U/L
ALT SERPL-CCNC: 20 U/L
ANION GAP SERPL CALC-SCNC: 13 MMOL/L
AST SERPL-CCNC: 21 U/L
BASOPHILS # BLD AUTO: 0.05 K/UL
BASOPHILS NFR BLD AUTO: 0.7 %
BILIRUB SERPL-MCNC: 0.6 MG/DL
BUN SERPL-MCNC: 19 MG/DL
CALCIUM SERPL-MCNC: 9.5 MG/DL
CHLORIDE SERPL-SCNC: 99 MMOL/L
CO2 SERPL-SCNC: 26 MMOL/L
CREAT SERPL-MCNC: 1.05 MG/DL
EGFR: 78 ML/MIN/1.73M2
EOSINOPHIL # BLD AUTO: 0.08 K/UL
EOSINOPHIL NFR BLD AUTO: 1 %
FOLATE SERPL-MCNC: 11.3 NG/ML
GLUCOSE SERPL-MCNC: 71 MG/DL
HCT VFR BLD CALC: 45.4 %
HGB BLD-MCNC: 14.7 G/DL
IMM GRANULOCYTES NFR BLD AUTO: 0.3 %
LYMPHOCYTES # BLD AUTO: 1.02 K/UL
LYMPHOCYTES NFR BLD AUTO: 13.3 %
MAN DIFF?: NORMAL
MCHC RBC-ENTMCNC: 28.9 PG
MCHC RBC-ENTMCNC: 32.4 GM/DL
MCV RBC AUTO: 89.4 FL
MONOCYTES # BLD AUTO: 0.59 K/UL
MONOCYTES NFR BLD AUTO: 7.7 %
NEUTROPHILS # BLD AUTO: 5.91 K/UL
NEUTROPHILS NFR BLD AUTO: 77 %
PLATELET # BLD AUTO: 251 K/UL
POTASSIUM SERPL-SCNC: 5.2 MMOL/L
PROT SERPL-MCNC: 7.4 G/DL
RBC # BLD: 5.08 M/UL
RBC # FLD: 13.3 %
SODIUM SERPL-SCNC: 138 MMOL/L
VIT B12 SERPL-MCNC: 709 PG/ML
WBC # FLD AUTO: 7.67 K/UL

## 2023-11-06 ENCOUNTER — RX RENEWAL (OUTPATIENT)
Age: 68
End: 2023-11-06

## 2023-11-06 RX ORDER — ATORVASTATIN CALCIUM 10 MG/1
10 TABLET, FILM COATED ORAL
Qty: 90 | Refills: 3 | Status: ACTIVE | COMMUNITY
Start: 2022-12-20 | End: 1900-01-01

## 2023-11-16 ENCOUNTER — APPOINTMENT (OUTPATIENT)
Dept: ELECTROPHYSIOLOGY | Facility: CLINIC | Age: 68
End: 2023-11-16
Payer: COMMERCIAL

## 2023-11-16 ENCOUNTER — NON-APPOINTMENT (OUTPATIENT)
Age: 68
End: 2023-11-16

## 2023-11-16 ENCOUNTER — APPOINTMENT (OUTPATIENT)
Dept: CARDIOLOGY | Facility: CLINIC | Age: 68
End: 2023-11-16
Payer: COMMERCIAL

## 2023-11-16 VITALS
OXYGEN SATURATION: 99 % | DIASTOLIC BLOOD PRESSURE: 60 MMHG | BODY MASS INDEX: 23.16 KG/M2 | HEART RATE: 90 BPM | WEIGHT: 171 LBS | SYSTOLIC BLOOD PRESSURE: 102 MMHG | HEIGHT: 72 IN

## 2023-11-16 DIAGNOSIS — I50.20 UNSPECIFIED SYSTOLIC (CONGESTIVE) HEART FAILURE: ICD-10-CM

## 2023-11-16 PROCEDURE — 99214 OFFICE O/P EST MOD 30 MIN: CPT | Mod: 25

## 2023-11-16 PROCEDURE — 93306 TTE W/DOPPLER COMPLETE: CPT

## 2023-11-16 PROCEDURE — 93000 ELECTROCARDIOGRAM COMPLETE: CPT

## 2023-11-27 PROBLEM — I48.91 UNSPECIFIED ATRIAL FIBRILLATION: Chronic | Status: INACTIVE | Noted: 2022-12-28 | Resolved: 2023-07-03

## 2023-12-01 ENCOUNTER — OUTPATIENT (OUTPATIENT)
Dept: OUTPATIENT SERVICES | Facility: HOSPITAL | Age: 68
LOS: 1 days | End: 2023-12-01
Payer: COMMERCIAL

## 2023-12-01 ENCOUNTER — APPOINTMENT (OUTPATIENT)
Dept: ULTRASOUND IMAGING | Facility: CLINIC | Age: 68
End: 2023-12-01

## 2023-12-01 DIAGNOSIS — Z98.890 OTHER SPECIFIED POSTPROCEDURAL STATES: Chronic | ICD-10-CM

## 2023-12-01 DIAGNOSIS — N20.0 CALCULUS OF KIDNEY: ICD-10-CM

## 2023-12-01 PROCEDURE — 76770 US EXAM ABDO BACK WALL COMP: CPT

## 2023-12-01 PROCEDURE — 76770 US EXAM ABDO BACK WALL COMP: CPT | Mod: 26

## 2023-12-11 ENCOUNTER — APPOINTMENT (OUTPATIENT)
Dept: UROLOGY | Facility: CLINIC | Age: 68
End: 2023-12-11
Payer: COMMERCIAL

## 2023-12-11 VITALS
HEIGHT: 72 IN | BODY MASS INDEX: 22.75 KG/M2 | SYSTOLIC BLOOD PRESSURE: 109 MMHG | HEART RATE: 84 BPM | DIASTOLIC BLOOD PRESSURE: 68 MMHG | WEIGHT: 168 LBS

## 2023-12-11 PROCEDURE — 99213 OFFICE O/P EST LOW 20 MIN: CPT

## 2024-01-18 RX ORDER — APIXABAN 5 MG/1
5 TABLET, FILM COATED ORAL TWICE DAILY
Refills: 0 | Status: ACTIVE | COMMUNITY

## 2024-01-24 PROBLEM — Z00.01 ANNUAL VISIT FOR GENERAL ADULT MEDICAL EXAMINATION WITH ABNORMAL FINDINGS: Status: ACTIVE | Noted: 2019-01-10

## 2024-01-24 PROBLEM — Z13.89 SCREENING FOR BLOOD OR PROTEIN IN URINE: Status: ACTIVE | Noted: 2019-01-10

## 2024-01-30 ENCOUNTER — APPOINTMENT (OUTPATIENT)
Dept: INTERNAL MEDICINE | Facility: CLINIC | Age: 69
End: 2024-01-30
Payer: COMMERCIAL

## 2024-01-30 VITALS
HEIGHT: 72 IN | BODY MASS INDEX: 22.89 KG/M2 | WEIGHT: 169 LBS | DIASTOLIC BLOOD PRESSURE: 70 MMHG | SYSTOLIC BLOOD PRESSURE: 130 MMHG

## 2024-01-30 DIAGNOSIS — I48.91 UNSPECIFIED ATRIAL FIBRILLATION: ICD-10-CM

## 2024-01-30 DIAGNOSIS — Z13.89 ENCOUNTER FOR SCREENING FOR OTHER DISORDER: ICD-10-CM

## 2024-01-30 DIAGNOSIS — Z00.01 ENCOUNTER FOR GENERAL ADULT MEDICAL EXAMINATION WITH ABNORMAL FINDINGS: ICD-10-CM

## 2024-01-30 PROCEDURE — 99214 OFFICE O/P EST MOD 30 MIN: CPT | Mod: 25

## 2024-01-30 PROCEDURE — 36415 COLL VENOUS BLD VENIPUNCTURE: CPT

## 2024-01-30 PROCEDURE — 99397 PER PM REEVAL EST PAT 65+ YR: CPT

## 2024-01-30 NOTE — ASSESSMENT
[FreeTextEntry1] : Wellness exam completed. All issues of health and screening up to date. Immunizations and screening reviewed with patient. All issues of health for the current year discussed in depth with patient. Patient was conversant during the exam and all pertinent issues addressed.  Fall prevention was addressed.Depression screen 0 in chart. All labs reviewed with patient as well. Review of systems and physical exam discussed with patient. Care plan for future followups discussed with patient. All issues of health for the current year discussed in depth with patient who was conversant and all relevant issues addressed.Patient examined and adjustments to therapy for HBP not needed Cholesterol levels discussed with patient. Compliance with oral medication were also addressed . Ideal LDL levels were  discussed with the patient. All issues regarding the implications of high cholesterol necessity for treatment were discussed with the patient who is conversant and all relevant questions were asked and answered. Diet-controlled diabetic will check A1c was higher in the past Up-to-date with vaccines needs to get a second date of Shingrix refuses routine vaccination and will consider RSV  PSA very low under 1 per patient request repeated following up with urology for kidney stones All issues discussed with patient will be transitioning to new physician in our practice for next visit in 6 months.  All issues regarding patient's health and medical problems have been discussed. The patient understands and concurs with the treatment plan.

## 2024-01-30 NOTE — HISTORY OF PRESENT ILLNESS
[FreeTextEntry1] : Patient here for evaluation of multiple medical problems and new issues to be discussed wellness [de-identified] : 20 pounds down since May  - reduced calories and exercise without any coexistent GI illness.  He has had 2 kidney stones with stents placed and has had several both IV contrast and noncontrast CAT scans that show no biliary disease or pancreatic disease.  He states he has very light stools without change in color of his urine.  Recent labs including stools have been unremarkable his weight loss is due to significant reduced calorie restriction and exercise  [id number] wellness and Patient here for evaluation of multiple medical problems and new issues to be discussed Patient doing very well since last visit compliant with all medications has not had recurrent bouts of atrial fibrillation did develop some mild hypothyroidism from amiodarone.  Mild reduction in ejection fraction from A-fib getting reevaluated.  No change in medications up-to-date with all screening procedures.  Has not received RSV vaccine which is strongly recommended

## 2024-01-31 LAB
25(OH)D3 SERPL-MCNC: 28.6 NG/ML
ALBUMIN SERPL ELPH-MCNC: 5.1 G/DL
ALP BLD-CCNC: 97 U/L
ALT SERPL-CCNC: 27 U/L
ANION GAP SERPL CALC-SCNC: 11 MMOL/L
APPEARANCE: CLEAR
AST SERPL-CCNC: 22 U/L
BACTERIA: NEGATIVE /HPF
BASOPHILS # BLD AUTO: 0.05 K/UL
BASOPHILS NFR BLD AUTO: 1.3 %
BILIRUB SERPL-MCNC: 0.5 MG/DL
BILIRUBIN URINE: NEGATIVE
BLOOD URINE: NEGATIVE
BUN SERPL-MCNC: 19 MG/DL
CALCIUM SERPL-MCNC: 9.5 MG/DL
CAST: 0 /LPF
CHLORIDE SERPL-SCNC: 100 MMOL/L
CHOLEST SERPL-MCNC: 97 MG/DL
CO2 SERPL-SCNC: 27 MMOL/L
COLOR: YELLOW
CREAT SERPL-MCNC: 0.84 MG/DL
EGFR: 96 ML/MIN/1.73M2
EOSINOPHIL # BLD AUTO: 0.12 K/UL
EOSINOPHIL NFR BLD AUTO: 3 %
EPITHELIAL CELLS: 0 /HPF
ESTIMATED AVERAGE GLUCOSE: 105 MG/DL
GLUCOSE QUALITATIVE U: NEGATIVE MG/DL
GLUCOSE SERPL-MCNC: 97 MG/DL
HBA1C MFR BLD HPLC: 5.3 %
HCT VFR BLD CALC: 44.1 %
HDLC SERPL-MCNC: 49 MG/DL
HGB BLD-MCNC: 14.4 G/DL
IMM GRANULOCYTES NFR BLD AUTO: 0.3 %
KETONES URINE: NEGATIVE MG/DL
LDLC SERPL CALC-MCNC: 36 MG/DL
LEUKOCYTE ESTERASE URINE: NEGATIVE
LYMPHOCYTES # BLD AUTO: 1.11 K/UL
LYMPHOCYTES NFR BLD AUTO: 27.9 %
MAN DIFF?: NORMAL
MCHC RBC-ENTMCNC: 29.4 PG
MCHC RBC-ENTMCNC: 32.7 GM/DL
MCV RBC AUTO: 90.2 FL
MICROSCOPIC-UA: NORMAL
MONOCYTES # BLD AUTO: 0.36 K/UL
MONOCYTES NFR BLD AUTO: 9 %
NEUTROPHILS # BLD AUTO: 2.33 K/UL
NEUTROPHILS NFR BLD AUTO: 58.5 %
NITRITE URINE: NEGATIVE
NONHDLC SERPL-MCNC: 47 MG/DL
PH URINE: 6
PLATELET # BLD AUTO: 251 K/UL
POTASSIUM SERPL-SCNC: 5.2 MMOL/L
PROT SERPL-MCNC: 6.9 G/DL
PROTEIN URINE: NEGATIVE MG/DL
PSA SERPL-MCNC: 2.8 NG/ML
RBC # BLD: 4.89 M/UL
RBC # FLD: 13.7 %
RED BLOOD CELLS URINE: 0 /HPF
SODIUM SERPL-SCNC: 138 MMOL/L
SPECIFIC GRAVITY URINE: 1.01
TRIGL SERPL-MCNC: 45 MG/DL
TSH SERPL-ACNC: 3.42 UIU/ML
UROBILINOGEN URINE: 0.2 MG/DL
WBC # FLD AUTO: 3.98 K/UL
WHITE BLOOD CELLS URINE: 0 /HPF

## 2024-02-01 ENCOUNTER — NON-APPOINTMENT (OUTPATIENT)
Age: 69
End: 2024-02-01

## 2024-02-24 ENCOUNTER — OUTPATIENT (OUTPATIENT)
Dept: OUTPATIENT SERVICES | Facility: HOSPITAL | Age: 69
LOS: 1 days | End: 2024-02-24
Payer: COMMERCIAL

## 2024-02-24 ENCOUNTER — APPOINTMENT (OUTPATIENT)
Dept: ULTRASOUND IMAGING | Facility: CLINIC | Age: 69
End: 2024-02-24
Payer: COMMERCIAL

## 2024-02-24 DIAGNOSIS — Z98.890 OTHER SPECIFIED POSTPROCEDURAL STATES: Chronic | ICD-10-CM

## 2024-02-24 DIAGNOSIS — N20.0 CALCULUS OF KIDNEY: ICD-10-CM

## 2024-02-24 DIAGNOSIS — Z00.8 ENCOUNTER FOR OTHER GENERAL EXAMINATION: ICD-10-CM

## 2024-02-24 PROCEDURE — 76770 US EXAM ABDO BACK WALL COMP: CPT | Mod: 26

## 2024-02-24 PROCEDURE — 76770 US EXAM ABDO BACK WALL COMP: CPT

## 2024-03-04 ENCOUNTER — APPOINTMENT (OUTPATIENT)
Dept: UROLOGY | Facility: CLINIC | Age: 69
End: 2024-03-04
Payer: COMMERCIAL

## 2024-03-04 VITALS
DIASTOLIC BLOOD PRESSURE: 70 MMHG | HEIGHT: 72 IN | BODY MASS INDEX: 23.3 KG/M2 | WEIGHT: 172 LBS | SYSTOLIC BLOOD PRESSURE: 132 MMHG | HEART RATE: 76 BPM

## 2024-03-04 DIAGNOSIS — Z12.5 ENCOUNTER FOR SCREENING FOR MALIGNANT NEOPLASM OF PROSTATE: ICD-10-CM

## 2024-03-04 PROCEDURE — 99214 OFFICE O/P EST MOD 30 MIN: CPT

## 2024-03-04 NOTE — ASSESSMENT
[FreeTextEntry1] :  plan: - continue with lemon and lime - will add moonstone - repeat PSA - TTM in 2 weeks to discuss - follow up in 3 months with ultrasound and 24 hr

## 2024-03-04 NOTE — LETTER BODY
[Consult Letter:] : I had the pleasure of evaluating your patient, [unfilled]. [Dear  ___] : Dear  [unfilled], [Please see my note below.] : Please see my note below. [Consult Closing:] : Thank you very much for allowing me to participate in the care of this patient.  If you have any questions, please do not hesitate to contact me. [Sincerely,] : Sincerely, [FreeTextEntry1] : reviewed labs with the patient appreciated new PSA, increase compared to previous years discussed with patient, will need to repeat will update if further work up is needed [FreeTextEntry3] : De Roque MD Director, Endourology and Stone Disease   [facility] [employer] [facility]

## 2024-03-14 ENCOUNTER — NON-APPOINTMENT (OUTPATIENT)
Age: 69
End: 2024-03-14

## 2024-03-14 ENCOUNTER — APPOINTMENT (OUTPATIENT)
Dept: ELECTROPHYSIOLOGY | Facility: CLINIC | Age: 69
End: 2024-03-14
Payer: COMMERCIAL

## 2024-03-14 VITALS
WEIGHT: 168 LBS | OXYGEN SATURATION: 97 % | HEIGHT: 72 IN | DIASTOLIC BLOOD PRESSURE: 64 MMHG | BODY MASS INDEX: 22.75 KG/M2 | HEART RATE: 83 BPM | SYSTOLIC BLOOD PRESSURE: 126 MMHG

## 2024-03-14 PROCEDURE — 93000 ELECTROCARDIOGRAM COMPLETE: CPT

## 2024-03-14 PROCEDURE — 99214 OFFICE O/P EST MOD 30 MIN: CPT | Mod: 25

## 2024-03-14 NOTE — REVIEW OF SYSTEMS
[Feeling Fatigued] : not feeling fatigued [SOB] : no shortness of breath [Dyspnea on exertion] : not dyspnea during exertion [Chest Discomfort] : no chest discomfort [Palpitations] : no palpitations [Orthopnea] : no orthopnea [Syncope] : no syncope [Dizziness] : no dizziness [Easy Bleeding] : no tendency for easy bleeding

## 2024-03-14 NOTE — HISTORY OF PRESENT ILLNESS
[FreeTextEntry1] : 69 year old gentleman with history of LBBB, possible TIA, and persistent atrial fibrillation with associated cardiomyopathy now s/p AF ablation 7/10/23 (PVI, CTI line) with Dr. Liz and presents for post ablation follow-up.  He was found to be in AF in 11/2022 which was persistent with elevated ventricular rates. TTE in this setting revealed LVEF 32%, and cath with nonobstructive CAD. He underwent MUNIR/DCCV and was started on antiarrhythmic medication, initially with amiodarone and then Multaq, and has mostly since remained in sinus rhythm. Medical therapy has been limited by sinus bradycardia, as well as thyroid dysfunction on amiodarone. Underwent AF ablation (PVI, CTI line) on 7/10/23. Of note baseline LBBB and HV interval=69 ms. No intraHisian conduction block was observed.  Multaq was stopped post ablation.  Holter worn 10/11/23-10/18/23 revealed SR, brief AT longest 16 beats in duration.  Repeat echo performed 11/16/23 revealed ef 49 % by curtis's method, visually 40-45%.   Holter worn 2/14/24-2/21/24 revealed brief PAT, no AF.   Today, Mr. Grullon reports he has been feeling well since last follow up. Denies palpitations, fatigue, PETERS, dizziness, near syncope, syncope. Tolerating Eliquis without c/o easy bruising, bleeding, or falls.

## 2024-03-14 NOTE — DISCUSSION/SUMMARY
[FreeTextEntry1] : 69 year old gentleman with history of LBBB, possible TIA, and persistent atrial fibrillation with associated cardiomyopathy now s/p AF ablation 7/10/23 (PVI, CTI line) with Dr. Liz and presents for post ablation follow-up.  He was found to be in AF in 11/2022 which was persistent with elevated ventricular rates. TTE in this setting revealed LVEF 32%, and cath with nonobstructive CAD. He underwent MUNIR/DCCV and was started on antiarrhythmic medication, initially with amiodarone and then Multaq, and has mostly since remained in sinus rhythm. Medical therapy has been limited by sinus bradycardia, as well as thyroid dysfunction on amiodarone. Underwent AF ablation (PVI, CTI line) on 7/10/23. Of note baseline LBBB and HV interval=69 ms. No intraHisian conduction block was observed.  Multaq was stopped post ablation.  Holter worn 10/11/23-10/18/23 revealed SR, brief AT longest 16 beats in duration.  Repeat echo performed 11/16/23 revealed ef 49 % by curtis's method, visually 40-45%.   Holter worn 2/14/24-2/21/24 revealed brief PAT, no AF.   Today, Mr. Grullon reports he has been feeling well since last follow up. Denies palpitations, fatigue, PETERS, dizziness, near syncope, syncope. Tolerating Eliquis without c/o easy bruising, bleeding, or falls.   Recommendation:   Mr. Grullon has been doing very well s/p ablation for AF maintaining SR on ECG and recent external monitoring now off AAT. [id number] 4, with now EF 45-50%, to continue Eliquis for stroke prophylaxis. Continue GDMT per Dr. Norton. To repeat 1 week holter in July 1 year post ablation with EP follow up once resulted. He has expressed desire to avoid long term medical therapy however role of GDMT for CM reviewed and he is agreeable to continue.  -EP follow up once monitoring complete.   Sophia CHAUHAN.  [EKG obtained to assist in diagnosis and management of assessed problem(s)] : EKG obtained to assist in diagnosis and management of assessed problem(s)

## 2024-03-20 ENCOUNTER — APPOINTMENT (OUTPATIENT)
Dept: CARDIOLOGY | Facility: CLINIC | Age: 69
End: 2024-03-20
Payer: COMMERCIAL

## 2024-03-20 ENCOUNTER — NON-APPOINTMENT (OUTPATIENT)
Age: 69
End: 2024-03-20

## 2024-03-20 VITALS
OXYGEN SATURATION: 99 % | HEIGHT: 72 IN | HEART RATE: 66 BPM | WEIGHT: 167 LBS | DIASTOLIC BLOOD PRESSURE: 67 MMHG | SYSTOLIC BLOOD PRESSURE: 108 MMHG | BODY MASS INDEX: 22.62 KG/M2

## 2024-03-20 DIAGNOSIS — R79.9 ABNORMAL FINDING OF BLOOD CHEMISTRY, UNSPECIFIED: ICD-10-CM

## 2024-03-20 PROCEDURE — 99215 OFFICE O/P EST HI 40 MIN: CPT | Mod: 25

## 2024-03-20 PROCEDURE — 93000 ELECTROCARDIOGRAM COMPLETE: CPT

## 2024-03-20 PROCEDURE — G2211 COMPLEX E/M VISIT ADD ON: CPT | Mod: NC,1L

## 2024-03-20 NOTE — CARDIOLOGY SUMMARY
[de-identified] : 3 20 2024:  sinus rhyhtm. LBBB is resolved.   8/31/2203:"  sinus. normal   1 2023:  sinu aureliano LBBB   11 23 2022 atrial fibrillation : LBBB. Hear rate 116 bopm.  [de-identified] : feb 2024:  [de-identified] : nov 2023:  LVEF 49%.  mild MR  normal RV  apr 2023:  : LVEF 47%,.  GLS : =18%.  normal RV.  moderate MR.   28 dec 2022:   LVEF 30-35%.  normal RV.  mild TR. moderately enlarged LA  [de-identified] : july 2023:  Afib Ablation PVI with CTI  [de-identified] : cardiac cta:  dec 2022:     Moderate CAC .  75th percentile. LAD 30-40%.  LVEF 35%.   Negative FFR.  [de-identified] : cath: non obstructive coronary artery disease .  [de-identified] : Jan 2024: LDL : 34.  HDL: 49;  Tgs: 45.

## 2024-03-20 NOTE — HISTORY OF PRESENT ILLNESS
[FreeTextEntry1] : amaurosis fugax, mitral valve ergurgit,. and atrial fibrillation   HPI for today: 3 20 2024:  feels good . no chest pain . no dyspnea on exertion . no dizziness. nosyncope he would liek to know if he can come off some meds.  when he gets up he feels dizziness. no syncope   old note: : he will be going off eliqwuis for dental work .  no chest pain. no dyspnea on exertion .  no dizziness,. he had an ablation done on 7./2023:  PVI with CTi line.   complatin with meds. aslight diddiness. when he gets up quickly. no syncope    schedueld for amonitor in a week.  and also   old note:  he is feelign ok.  no chest pain . no dyspnea.  no leg edema.  toelrating meds on anticoagulation . no bleedin g.  he is insinus.     old note: this is a 67 yea rol dmale with no significant medical history here with newly diagnosed afgib.  he has not seen his docotr for quite someitime. does not take meds.  . he takes vitamins.  he went for a routine physical cehck.  he wanted to get a covid vaccine. and also he has MR.   also noted that 7 weeks ago, he had an episode where her was looking a tht eclock and he had sudfden painless loss of vision that was teransient and resolved < 1 min. he is feeling forgetful .  recently.    he took his Bp in the monring and 8 : 30 and . is heart rate was 66  when he was at Dr. mejias office. he had rapid atrial fibrillation .  and was referred to a cardiologist he does not feel the palppuitations. no chest pain. no dyszpnea.   tirado snot feel papitaitons.    No smoking. alcohol 5 beers a week.    no drugs   Famiyl shitory Mother:  no myocardial infarction . no cerebrovascular accident  father:  no myocardial infarction . no cerebrovascular accident

## 2024-03-20 NOTE — DISCUSSION/SUMMARY
[Patient] : the patient [Risks] : risks [Benefits] : benefits [Alternatives] : alternatives [With Me] : with me [___ Month(s)] : in [unfilled] month(s) [EKG obtained to assist in diagnosis and management of assessed problem(s)] : EKG obtained to assist in diagnosis and management of assessed problem(s) [FreeTextEntry1] : this is a 69 year male with no significant medical history here with newly diagnosed afib.  1) atrial fibrillation :   CHADSVAASC 4 (CVA , HTN and >?65) eliqwuis 5 mg Q12.    s/p   cardioversion.   s/pablation in july 2023 : beta blocker off antiarrhytymic      2) congestive heart failure . HFref:  beta blocker entresto,   if EF improved or normlaized.  we will discontinue Entresto  3) sinus aureliano:  resolved.  4)  coronary artery disease :  lipitor 10 mg. d/c  aspirin 81  5) low BP discussed about symptoms of fatigue.  no changes in meds for now.

## 2024-03-20 NOTE — PHYSICAL EXAM
[Well Nourished] : well nourished [Well Developed] : well developed [No Acute Distress] : no acute distress [Normal Conjunctiva] : normal conjunctiva [Normal Venous Pressure] : normal venous pressure [No Carotid Bruit] : no carotid bruit [Normal S1, S2] : normal S1, S2 [No Rub] : no rub [No Murmur] : no murmur [No Gallop] : no gallop [Clear Lung Fields] : clear lung fields [Good Air Entry] : good air entry [No Respiratory Distress] : no respiratory distress  [Non Tender] : non-tender [Soft] : abdomen soft [No Masses/organomegaly] : no masses/organomegaly [Normal Gait] : normal gait [Normal Bowel Sounds] : normal bowel sounds [No Edema] : no edema [No Clubbing] : no clubbing [No Cyanosis] : no cyanosis [No Varicosities] : no varicosities [No Rash] : no rash [No Skin Lesions] : no skin lesions [Moves all extremities] : moves all extremities [No Focal Deficits] : no focal deficits [Normal Speech] : normal speech [Alert and Oriented] : alert and oriented [Normal memory] : normal memory

## 2024-04-03 ENCOUNTER — APPOINTMENT (OUTPATIENT)
Dept: UROLOGY | Facility: CLINIC | Age: 69
End: 2024-04-03
Payer: COMMERCIAL

## 2024-04-03 PROCEDURE — 99441: CPT

## 2024-04-03 NOTE — HISTORY OF PRESENT ILLNESS
[Home] : at home, [unfilled] , at the time of the visit. [Medical Office: ([facility])___] : at the medical office located in  [Verbal consent obtained from patient] : the patient, [unfilled] [Spouse] : spouse [FreeTextEntry1] : called to discuss PSA result reviewed new PSA - 2.4 (free 17%) discussed with patient still elevated compared to previous years < 1  recommended prostate MRI in preparation for fusion prostate biopsy

## 2024-04-25 ENCOUNTER — RX RENEWAL (OUTPATIENT)
Age: 69
End: 2024-04-25

## 2024-05-01 ENCOUNTER — APPOINTMENT (OUTPATIENT)
Dept: GASTROENTEROLOGY | Facility: CLINIC | Age: 69
End: 2024-05-01
Payer: COMMERCIAL

## 2024-05-01 VITALS
BODY MASS INDEX: 22.75 KG/M2 | SYSTOLIC BLOOD PRESSURE: 120 MMHG | WEIGHT: 168 LBS | HEART RATE: 82 BPM | DIASTOLIC BLOOD PRESSURE: 75 MMHG | HEIGHT: 72 IN | RESPIRATION RATE: 14 BRPM | OXYGEN SATURATION: 99 %

## 2024-05-01 DIAGNOSIS — N20.0 CALCULUS OF KIDNEY: ICD-10-CM

## 2024-05-01 DIAGNOSIS — Z86.79 OTHER SPECIFIED POSTPROCEDURAL STATES: ICD-10-CM

## 2024-05-01 DIAGNOSIS — H54.7 UNSPECIFIED VISUAL LOSS: ICD-10-CM

## 2024-05-01 DIAGNOSIS — N41.0 ACUTE PROSTATITIS: ICD-10-CM

## 2024-05-01 DIAGNOSIS — Z98.890 OTHER SPECIFIED POSTPROCEDURAL STATES: ICD-10-CM

## 2024-05-01 DIAGNOSIS — R93.1 ABNORMAL FINDINGS ON DIAGNOSTIC IMAGING OF HEART AND CORONARY CIRCULATION: ICD-10-CM

## 2024-05-01 DIAGNOSIS — R73.03 PREDIABETES.: ICD-10-CM

## 2024-05-01 DIAGNOSIS — I44.7 LEFT BUNDLE-BRANCH BLOCK, UNSPECIFIED: ICD-10-CM

## 2024-05-01 PROCEDURE — 99203 OFFICE O/P NEW LOW 30 MIN: CPT

## 2024-05-01 RX ORDER — ASPIRIN ENTERIC COATED TABLETS 81 MG 81 MG/1
81 TABLET, DELAYED RELEASE ORAL DAILY
Qty: 90 | Refills: 3 | Status: DISCONTINUED | COMMUNITY
Start: 2022-12-20 | End: 2024-05-01

## 2024-05-01 RX ORDER — APIXABAN 5 MG/1
5 TABLET, FILM COATED ORAL
Qty: 180 | Refills: 3 | Status: DISCONTINUED | COMMUNITY
Start: 2022-11-23 | End: 2024-05-01

## 2024-05-01 RX ORDER — SODIUM SULFATE, POTASSIUM SULFATE AND MAGNESIUM SULFATE 1.6; 3.13; 17.5 G/177ML; G/177ML; G/177ML
17.5-3.13-1.6 SOLUTION ORAL
Qty: 1 | Refills: 0 | Status: ACTIVE | COMMUNITY
Start: 2024-05-01 | End: 1900-01-01

## 2024-05-01 NOTE — ASSESSMENT
[FreeTextEntry1] : SONIA DAILEY is a 69 year old male, with a PMH of AFib on Eliquis s/p ablation, CAD, CHF, LBBB, HLD, who presents today for colon cancer screening. Family history of colon polyps, brother. Last colonoscopy was in 2018.  Colonoscopy to be scheduled. I have discussed the indications, alternatives, benefits and potential risk including, but not limited to, bleeding, perforation, missed lesions and anesthesia complications. Bowel prep instructions discussed at length. All questions were answered. Pt is medically optimized. Labs ordered. Suprep sent to pharmacy.

## 2024-05-01 NOTE — HISTORY OF PRESENT ILLNESS
[FreeTextEntry1] : SONIA DAILEY is a 69 year old male, with a PMH of AFib on Eliquis s/p ablation, CAD, CHF, LBBB, HLD, who presents today for colon cancer screening. Family history of several small adenomatous colon polyps, brother. Last colonoscopy was in 2018 which was significant for diverticulosis. Prior colonoscopies were also negative for colon cancer or polyps. He has 1 BM/day. Denies abdominal pain, constipation, diarrhea, rectal bleeding, melena, black stools, unintentional weight loss, nausea, vomiting, GERD, or dysphagia.

## 2024-05-01 NOTE — ADDENDUM
[FreeTextEntry1] : I, Luz Maria Saavedra NP, acted as scribe for DAVID Bush for this patient encounter.

## 2024-05-01 NOTE — PHYSICAL EXAM
AAOx4, RA, occasionally tachycardic, RA, up with supervision, walked in the hallways. C/O nausea without vomiting, PRN compazine given with relief x2. C/O back pain relieved with scheduled lidocaine patch. 1 BM on shift, urinating adequately. L PIV C/D/I flushed. Medications given per MAR. Will continue to monitor and notify physician of any abnormal findings. Patient was refusing Nystatin in the AM and afternoon. He was agreeable in the PM. Endorsed to oncoming RN.   [Alert] : alert [Normal Voice/Communication] : normal voice/communication [Healthy Appearing] : healthy appearing [No Acute Distress] : no acute distress [Sclera] : the sclera and conjunctiva were normal [Hearing Threshold Finger Rub Not Starr] : hearing was normal [Normal Lips/Gums] : the lips and gums were normal [Oropharynx] : the oropharynx was normal [Normal Appearance] : the appearance of the neck was normal [No Neck Mass] : no neck mass was observed [No Respiratory Distress] : no respiratory distress [No Acc Muscle Use] : no accessory muscle use [Respiration, Rhythm And Depth] : normal respiratory rhythm and effort [Auscultation Breath Sounds / Voice Sounds] : lungs were clear to auscultation bilaterally [Heart Rate And Rhythm] : heart rate was normal and rhythm regular [Normal S1, S2] : normal S1 and S2 [Murmurs] : no murmurs [Bowel Sounds] : normal bowel sounds [Abdomen Tenderness] : non-tender [No Masses] : no abdominal mass palpated [Abdomen Soft] : soft [] : no hepatosplenomegaly [Oriented To Time, Place, And Person] : oriented to person, place, and time

## 2024-05-07 LAB — HEMOCCULT STL QL IA: NEGATIVE

## 2024-05-17 ENCOUNTER — NON-APPOINTMENT (OUTPATIENT)
Age: 69
End: 2024-05-17

## 2024-05-20 ENCOUNTER — APPOINTMENT (OUTPATIENT)
Dept: CARDIOLOGY | Facility: CLINIC | Age: 69
End: 2024-05-20
Payer: COMMERCIAL

## 2024-05-20 PROCEDURE — 93306 TTE W/DOPPLER COMPLETE: CPT

## 2024-05-29 ENCOUNTER — APPOINTMENT (OUTPATIENT)
Dept: CARDIOLOGY | Facility: CLINIC | Age: 69
End: 2024-05-29
Payer: COMMERCIAL

## 2024-05-29 VITALS
HEIGHT: 72 IN | DIASTOLIC BLOOD PRESSURE: 69 MMHG | BODY MASS INDEX: 24.92 KG/M2 | HEART RATE: 82 BPM | OXYGEN SATURATION: 98 % | WEIGHT: 184 LBS | TEMPERATURE: 98.2 F | SYSTOLIC BLOOD PRESSURE: 124 MMHG

## 2024-05-29 DIAGNOSIS — Z00.00 ENCOUNTER FOR GENERAL ADULT MEDICAL EXAMINATION W/OUT ABNORMAL FINDINGS: ICD-10-CM

## 2024-05-29 DIAGNOSIS — I10 ESSENTIAL (PRIMARY) HYPERTENSION: ICD-10-CM

## 2024-05-29 DIAGNOSIS — I25.10 ATHEROSCLEROTIC HEART DISEASE OF NATIVE CORONARY ARTERY W/OUT ANGINA PECTORIS: ICD-10-CM

## 2024-05-29 DIAGNOSIS — I48.91 UNSPECIFIED ATRIAL FIBRILLATION: ICD-10-CM

## 2024-05-29 DIAGNOSIS — I42.9 CARDIOMYOPATHY, UNSPECIFIED: ICD-10-CM

## 2024-05-29 PROCEDURE — 99215 OFFICE O/P EST HI 40 MIN: CPT | Mod: 25

## 2024-05-29 PROCEDURE — G2211 COMPLEX E/M VISIT ADD ON: CPT | Mod: NC

## 2024-05-29 PROCEDURE — 93000 ELECTROCARDIOGRAM COMPLETE: CPT

## 2024-05-29 RX ORDER — ALPRAZOLAM 0.25 MG/1
0.25 TABLET ORAL
Qty: 3 | Refills: 0 | Status: ACTIVE | COMMUNITY
Start: 2024-05-29 | End: 1900-01-01

## 2024-05-29 NOTE — DISCUSSION/SUMMARY
[Patient] : the patient [Risks] : risks [Benefits] : benefits [Alternatives] : alternatives [With Me] : with me [___ Month(s)] : in [unfilled] month(s) [FreeTextEntry1] : this is a 69 year male with no significant medical history here with newly diagnosed afib.   1) Pre-operative cardiovascular risk evaluation and management : colonoscopy. hold Eliquis 2-3 days priro to procedure. resume after procedure.  no further work up is needed. Optimized cardiomyopathy .  normalized EF  2) atrial fibrillation :   CHADSVAASC 4 (CVA , HTN and >?65) Eliquis 5 mg Q12.    s/p   cardioversion.   s/p ablation in july 2023 : beta blocker    3) congestive heart failure . HFref:  recovered EF  beta blocker entresto,  patient wanted to come off Entresto. we will continue for now reevaluate next year after the echo.  4) sinus aureliano:  resolved.  5)  coronary artery disease :  lipitor 10 mg.   6) low BP discussed about symptoms of fatigue.  no changes in meds for now.     [EKG obtained to assist in diagnosis and management of assessed problem(s)] : EKG obtained to assist in diagnosis and management of assessed problem(s)

## 2024-05-29 NOTE — CARDIOLOGY SUMMARY
[de-identified] :  3 20 2024:  sinus rhyhtm. LBBB is resolved.   8/31/2203:"  sinus. normal   1 2023:  sinu aureliano LBBB   11 23 2022 atrial fibrillation : LBBB. Hear rate 116 bopm.  [de-identified] : feb 2024:  [de-identified] : 5 2024:  LVEF 54%.  nov 2023:  LVEF 49%.  mild MR  normal RV  apr 2023:  : LVEF 47%,.  GLS : =18%.  normal RV.  moderate MR.   28 dec 2022:   LVEF 30-35%.  normal RV.  mild TR. moderately enlarged LA  [de-identified] : cardiac cta:  dec 2022:     Moderate CAC .  75th percentile. LAD 30-40%.  LVEF 35%.   Negative FFR.  [de-identified] : july 2023:  Afib Ablation PVI with CTI  [de-identified] : cath: non obstructive coronary artery disease .  [de-identified] : Jan 2024: LDL : 36.  HDL: 49;  Tgs: 45.

## 2024-06-04 ENCOUNTER — APPOINTMENT (OUTPATIENT)
Dept: ULTRASOUND IMAGING | Facility: CLINIC | Age: 69
End: 2024-06-04
Payer: COMMERCIAL

## 2024-06-04 ENCOUNTER — OUTPATIENT (OUTPATIENT)
Dept: OUTPATIENT SERVICES | Facility: HOSPITAL | Age: 69
LOS: 1 days | End: 2024-06-04
Payer: COMMERCIAL

## 2024-06-04 DIAGNOSIS — Z98.890 OTHER SPECIFIED POSTPROCEDURAL STATES: Chronic | ICD-10-CM

## 2024-06-04 DIAGNOSIS — N20.0 CALCULUS OF KIDNEY: ICD-10-CM

## 2024-06-04 PROCEDURE — 76770 US EXAM ABDO BACK WALL COMP: CPT

## 2024-06-04 PROCEDURE — 76770 US EXAM ABDO BACK WALL COMP: CPT | Mod: 26

## 2024-06-05 LAB
ANION GAP SERPL CALC-SCNC: 12 MMOL/L
BASOPHILS # BLD AUTO: 0.04 K/UL
BASOPHILS NFR BLD AUTO: 0.7 %
BUN SERPL-MCNC: 21 MG/DL
CALCIUM SERPL-MCNC: 9.7 MG/DL
CHLORIDE SERPL-SCNC: 99 MMOL/L
CO2 SERPL-SCNC: 25 MMOL/L
CREAT SERPL-MCNC: 0.89 MG/DL
EGFR: 94 ML/MIN/1.73M2
EOSINOPHIL # BLD AUTO: 0.07 K/UL
EOSINOPHIL NFR BLD AUTO: 1.2 %
GLUCOSE SERPL-MCNC: 112 MG/DL
HCT VFR BLD CALC: 45.3 %
HGB BLD-MCNC: 15 G/DL
IMM GRANULOCYTES NFR BLD AUTO: 0.2 %
INR PPP: 1.28 RATIO
LYMPHOCYTES # BLD AUTO: 0.85 K/UL
LYMPHOCYTES NFR BLD AUTO: 14.2 %
MAN DIFF?: NORMAL
MCHC RBC-ENTMCNC: 29.6 PG
MCHC RBC-ENTMCNC: 33.1 GM/DL
MCV RBC AUTO: 89.3 FL
MONOCYTES # BLD AUTO: 0.43 K/UL
MONOCYTES NFR BLD AUTO: 7.2 %
NEUTROPHILS # BLD AUTO: 4.57 K/UL
NEUTROPHILS NFR BLD AUTO: 76.5 %
PLATELET # BLD AUTO: 281 K/UL
POTASSIUM SERPL-SCNC: 4.9 MMOL/L
PT BLD: 14.4 SEC
RBC # BLD: 5.07 M/UL
RBC # FLD: 13.8 %
SODIUM SERPL-SCNC: 136 MMOL/L
WBC # FLD AUTO: 5.97 K/UL

## 2024-06-10 ENCOUNTER — APPOINTMENT (OUTPATIENT)
Dept: UROLOGY | Facility: CLINIC | Age: 69
End: 2024-06-10
Payer: COMMERCIAL

## 2024-06-10 ENCOUNTER — APPOINTMENT (OUTPATIENT)
Dept: UROLOGY | Facility: CLINIC | Age: 69
End: 2024-06-10

## 2024-06-10 DIAGNOSIS — R97.20 ELEVATED PROSTATE, SPECIFIC ANTIGEN [PSA]: ICD-10-CM

## 2024-06-10 PROCEDURE — 99442: CPT

## 2024-06-10 NOTE — HISTORY OF PRESENT ILLNESS
[Home] : at home, [unfilled] , at the time of the visit. [Medical Office: ([facility])___] : at the medical office located in  [Verbal consent obtained from patient] : the patient, [unfilled] [FreeTextEntry1] : 70 yo M see previous note 1. nephrolithiasis 2. PSA  was planned for MRI due to sudden increase in PSA patient was on vitamin (lycopene) he believes was causing the reduction in PSA now he wishes to start the same pill again and repeat PSA in 1-2 months instead of having an MRI  reviewed new ultrasound: no stones sent 24 hr a week ago  plan: - repeat PSA in 1-2 months - will discuss 24 hr then - TTM to decide on follow up

## 2024-06-10 NOTE — ASSESSMENT
[FreeTextEntry1] :  plan: - repeat PSA in 1-2 months - will discuss 24 hr then - TTM to decide on follow up

## 2024-06-24 ENCOUNTER — TRANSCRIPTION ENCOUNTER (OUTPATIENT)
Age: 69
End: 2024-06-24

## 2024-06-25 ENCOUNTER — RESULT REVIEW (OUTPATIENT)
Age: 69
End: 2024-06-25

## 2024-06-25 ENCOUNTER — APPOINTMENT (OUTPATIENT)
Dept: GASTROENTEROLOGY | Facility: GI CENTER | Age: 69
End: 2024-06-25
Payer: COMMERCIAL

## 2024-06-25 ENCOUNTER — OUTPATIENT (OUTPATIENT)
Dept: OUTPATIENT SERVICES | Facility: HOSPITAL | Age: 69
LOS: 1 days | End: 2024-06-25
Payer: COMMERCIAL

## 2024-06-25 DIAGNOSIS — Z83.719 FAMILY HISTORY OF COLON POLYPS, UNSPECIFIED: ICD-10-CM

## 2024-06-25 DIAGNOSIS — Z98.890 OTHER SPECIFIED POSTPROCEDURAL STATES: Chronic | ICD-10-CM

## 2024-06-25 DIAGNOSIS — Z83.71 FAMILY HISTORY OF COLONIC POLYPS: ICD-10-CM

## 2024-06-25 DIAGNOSIS — Z12.11 ENCOUNTER FOR SCREENING FOR MALIGNANT NEOPLASM OF COLON: ICD-10-CM

## 2024-06-25 PROCEDURE — 88305 TISSUE EXAM BY PATHOLOGIST: CPT | Mod: 26

## 2024-06-25 PROCEDURE — 45385 COLONOSCOPY W/LESION REMOVAL: CPT | Mod: PT

## 2024-06-27 LAB — SURGICAL PATHOLOGY STUDY: SIGNIFICANT CHANGE UP

## 2024-07-03 ENCOUNTER — RX RENEWAL (OUTPATIENT)
Age: 69
End: 2024-07-03

## 2024-07-07 NOTE — ASU DISCHARGE PLAN (ADULT/PEDIATRIC) - NS DC INTERPRETER YES NO
MD Notification    Notified Person: MD    Notified Person Name: Lavell Bishop    Notification Date/Time: 07/06/24  0235    Notification Interaction: Vocera    Purpose of Notification: Pts Right arm blood cultures came back gram positive coccyx per ID.    Orders Received: MD aware    Comments:    No

## 2024-07-19 DIAGNOSIS — D12.6 BENIGN NEOPLASM OF COLON, UNSPECIFIED: ICD-10-CM

## 2024-08-08 ENCOUNTER — RX RENEWAL (OUTPATIENT)
Age: 69
End: 2024-08-08

## 2024-09-09 ENCOUNTER — APPOINTMENT (OUTPATIENT)
Dept: UROLOGY | Facility: CLINIC | Age: 69
End: 2024-09-09
Payer: COMMERCIAL

## 2024-09-09 DIAGNOSIS — R97.20 ELEVATED PROSTATE, SPECIFIC ANTIGEN [PSA]: ICD-10-CM

## 2024-09-09 PROCEDURE — 99442: CPT

## 2024-09-09 NOTE — ASSESSMENT
[FreeTextEntry1] :  plan: - will find result of 24 hr - will plan for prostate MRI for planning of fusion biopsies - follow up in 2 weeks to review

## 2024-09-09 NOTE — HISTORY OF PRESENT ILLNESS
[Home] : at home, [unfilled] , at the time of the visit. [Medical Office: ([facility])___] : at the medical office located in  [Verbal consent obtained from patient] : the patient, [unfilled] [FreeTextEntry1] : 68 yo M see previous note 1. nephrolithiasis 2. PSA  still no result of 24 hr  reviewed new PSA 9/3/2024 -3.68 (even higher) discussed the need for MRI and the patient agrees will need prostate MRI for planning for fusion biopsies  plan: - will find result of 24 hr - will plan for prostate MRI for planning of fusion biopsies - follow up in 2 weeks to review

## 2024-09-12 ENCOUNTER — APPOINTMENT (OUTPATIENT)
Dept: ELECTROPHYSIOLOGY | Facility: CLINIC | Age: 69
End: 2024-09-12
Payer: COMMERCIAL

## 2024-09-12 VITALS
SYSTOLIC BLOOD PRESSURE: 126 MMHG | HEIGHT: 72 IN | OXYGEN SATURATION: 97 % | WEIGHT: 183 LBS | BODY MASS INDEX: 24.79 KG/M2 | HEART RATE: 75 BPM | DIASTOLIC BLOOD PRESSURE: 74 MMHG

## 2024-09-12 DIAGNOSIS — I48.91 UNSPECIFIED ATRIAL FIBRILLATION: ICD-10-CM

## 2024-09-12 DIAGNOSIS — I42.9 CARDIOMYOPATHY, UNSPECIFIED: ICD-10-CM

## 2024-09-12 PROCEDURE — 99215 OFFICE O/P EST HI 40 MIN: CPT | Mod: 25

## 2024-09-12 PROCEDURE — 93000 ELECTROCARDIOGRAM COMPLETE: CPT

## 2024-09-12 NOTE — ADDENDUM
[FreeTextEntry1] : Pt has been doing very well following AF ablation, with no noted recurrent AF and off antiarrhythmic medication. LV function has now normalized. LBBB has also resolved, interestingly (may have been rate related).  Will continue beta blockade and anticoagulation.  EP followup as needed.

## 2024-09-12 NOTE — DISCUSSION/SUMMARY
[EKG obtained to assist in diagnosis and management of assessed problem(s)] : EKG obtained to assist in diagnosis and management of assessed problem(s) [FreeTextEntry1] : 69 year old gentleman with history of LBBB, possible TIA, and persistent atrial fibrillation with associated cardiomyopathy s/p AF ablation 7/10/23 (PVI, CTI line). Mr. Grullon reports he has been feeling well since last follow up. Denies palpitations, fatigue, PETERS, dizziness, near syncope, syncope. Tolerating Eliquis without c/o easy bruising, bleeding, or falls. Recent holter monitoring 7/24/24- 7/31/24 showed averaged HR 62 bpm, Minimum HR 32 ( sleep) - 139bpm brief episodes of PAT noted. Presenty Mr. Grullon is not interested in undergoing watchman procedure therefore, will continue AC given his CAHDS score is 4.   Recommendation:  Mr. Grullon has been doing very well s/p ablation for AF maintaining SR on ECG and recent external monitoring now off AAT. LV function normalized and no further LBBB on ECG.  to continue Eliquis for stroke prophylaxis. Continue GDMT per Dr. Norton.

## 2024-09-12 NOTE — CARDIOLOGY SUMMARY
[de-identified] :  3 20 2024:  sinus rhyhtm. LBBB is resolved.   8/31/2203:"  sinus. normal   1 2023:  sinu aureliano LBBB   11 23 2022 atrial fibrillation : LBBB. Hear rate 116 bopm.  [de-identified] : 5/20/ 2024: CONCLUSIONS: 1. The left atrium is normal in size. 2. Left ventricular systolic function is normal with an ejection fraction of 54 % by Yi's method of disks. 3. Normal left ventricular diastolic function. 4. The right atrium is normal in size. 5. Normal right ventricular cavity size and normal systolic function. 6. No significant valvular disease. 7. No pericardial effusion seen. 8. Compared to the transthoracic echocardiogram performed on 11/16/2023, LVEF has improved from prior (40-45% to 50-55%)..  nov 2023:  LVEF 49%.  mild MR  normal RV  apr 2023:  : LVEF 47%,.  GLS : =18%.  normal RV.  moderate MR.   28 dec 2022:   LVEF 30-35%.  normal RV.  mild TR. moderately enlarged LA  [de-identified] : cardiac cta:  dec 2022:     Moderate CAC .  75th percentile. LAD 30-40%.  LVEF 35%.   Negative FFR.  [de-identified] : july 2023:  Afib Ablation PVI with CTI  [de-identified] : cath: non obstructive coronary artery disease .  [de-identified] : Jan 2024: LDL : 36.  HDL: 49;  Tgs: 45.

## 2024-09-12 NOTE — CARDIOLOGY SUMMARY
[de-identified] :  3 20 2024:  sinus rhyhtm. LBBB is resolved.   8/31/2203:"  sinus. normal   1 2023:  sinu aureliano LBBB   11 23 2022 atrial fibrillation : LBBB. Hear rate 116 bopm.  [de-identified] : 5/20/ 2024: CONCLUSIONS: 1. The left atrium is normal in size. 2. Left ventricular systolic function is normal with an ejection fraction of 54 % by Yi's method of disks. 3. Normal left ventricular diastolic function. 4. The right atrium is normal in size. 5. Normal right ventricular cavity size and normal systolic function. 6. No significant valvular disease. 7. No pericardial effusion seen. 8. Compared to the transthoracic echocardiogram performed on 11/16/2023, LVEF has improved from prior (40-45% to 50-55%)..  nov 2023:  LVEF 49%.  mild MR  normal RV  apr 2023:  : LVEF 47%,.  GLS : =18%.  normal RV.  moderate MR.   28 dec 2022:   LVEF 30-35%.  normal RV.  mild TR. moderately enlarged LA  [de-identified] : cardiac cta:  dec 2022:     Moderate CAC .  75th percentile. LAD 30-40%.  LVEF 35%.   Negative FFR.  [de-identified] : july 2023:  Afib Ablation PVI with CTI  [de-identified] : cath: non obstructive coronary artery disease .  [de-identified] : Jan 2024: LDL : 36.  HDL: 49;  Tgs: 45.

## 2024-09-12 NOTE — HISTORY OF PRESENT ILLNESS
[FreeTextEntry1] : 69 year old gentleman with history of LBBB, possible TIA, and persistent atrial fibrillation with associated cardiomyopathy s/p AF ablation 7/10/23 (PVI, CTI line).   He was found to be in AF in 11/2022 which was persistent with elevated ventricular rates. TTE in this setting revealed LVEF 32%, and cath with nonobstructive CAD. He underwent MUNIR/DCCV and was started on antiarrhythmic medication, initially with amiodarone and then Multaq. Medical therapy has been limited by sinus bradycardia, as well as thyroid dysfunction on amiodarone. Underwent AF ablation (PVI, CTI line) on 7/10/23. Of note baseline LBBB and HV interval=69 ms during EP study.   Multaq was stopped post ablation. He has done well and maintaind sinus rhythm on followup.  Holter worn 10/11/23-10/18/23 revealed SR, brief AT longest 16 beats in duration. Holter worn 2/14/24-2/21/24 revealed brief PAT, no AF.  Recent monitor 7/24-7/31/24 revealed SR (avg 62, range  bpm) with brief pAT up to 13 beats. no AV saritha.  Repeat echo performed 11/16/23 revealed ef 49 % by curtis's method, visually 40-45%.  Recent TTE 5/20/24 now revealed normalized LV function, LVEF 50-55%.   Today, Mr. Grullon reports he has been feeling well since last follow up. Denies palpitations, fatigue, PETERS, dizziness, near syncope, syncope. Tolerating Eliquis without c/o easy bruising, bleeding, or falls. Recent holter monitoring 7/24/24- 7/31/24 showed averaged HR 62 bpm, Minimum HR 32 ( sleep) - 139bpm brief episodes of PAT noted. Presenty Mr. Grullon is not interested in undergoing watchman procedure therefore, will continue AC given his CAHDS score is 4.

## 2024-09-30 ENCOUNTER — APPOINTMENT (OUTPATIENT)
Dept: UROLOGY | Facility: CLINIC | Age: 69
End: 2024-09-30
Payer: COMMERCIAL

## 2024-09-30 DIAGNOSIS — R82.998 OTHER ABNORMAL FINDINGS IN URINE: ICD-10-CM

## 2024-09-30 DIAGNOSIS — R97.20 ELEVATED PROSTATE, SPECIFIC ANTIGEN [PSA]: ICD-10-CM

## 2024-09-30 PROCEDURE — 99214 OFFICE O/P EST MOD 30 MIN: CPT

## 2024-10-01 RX ORDER — DIBASIC SODIUM PHOSPHATE, MONOBASIC SODIUM PHOSPHATE 7; 19 G/118ML; G/118ML
7-19 ENEMA RECTAL
Qty: 1 | Refills: 0 | Status: ACTIVE | COMMUNITY
Start: 2024-10-01 | End: 1900-01-01

## 2024-10-02 ENCOUNTER — OUTPATIENT (OUTPATIENT)
Dept: OUTPATIENT SERVICES | Facility: HOSPITAL | Age: 69
LOS: 1 days | End: 2024-10-02
Payer: COMMERCIAL

## 2024-10-02 ENCOUNTER — OUTPATIENT (OUTPATIENT)
Dept: OUTPATIENT SERVICES | Facility: HOSPITAL | Age: 69
LOS: 1 days | End: 2024-10-02

## 2024-10-02 DIAGNOSIS — R97.20 ELEVATED PROSTATE SPECIFIC ANTIGEN [PSA]: ICD-10-CM

## 2024-10-02 DIAGNOSIS — Z98.890 OTHER SPECIFIED POSTPROCEDURAL STATES: Chronic | ICD-10-CM

## 2024-10-02 DIAGNOSIS — Z00.8 ENCOUNTER FOR OTHER GENERAL EXAMINATION: ICD-10-CM

## 2024-10-02 PROCEDURE — C8001: CPT

## 2024-10-04 ENCOUNTER — APPOINTMENT (OUTPATIENT)
Dept: INTERNAL MEDICINE | Facility: CLINIC | Age: 69
End: 2024-10-04
Payer: COMMERCIAL

## 2024-10-04 VITALS
HEART RATE: 75 BPM | DIASTOLIC BLOOD PRESSURE: 73 MMHG | WEIGHT: 178 LBS | BODY MASS INDEX: 24.11 KG/M2 | HEIGHT: 72 IN | SYSTOLIC BLOOD PRESSURE: 117 MMHG

## 2024-10-04 DIAGNOSIS — R79.9 ABNORMAL FINDING OF BLOOD CHEMISTRY, UNSPECIFIED: ICD-10-CM

## 2024-10-04 DIAGNOSIS — I48.91 UNSPECIFIED ATRIAL FIBRILLATION: ICD-10-CM

## 2024-10-04 DIAGNOSIS — R53.83 OTHER FATIGUE: ICD-10-CM

## 2024-10-04 DIAGNOSIS — E55.9 VITAMIN D DEFICIENCY, UNSPECIFIED: ICD-10-CM

## 2024-10-04 DIAGNOSIS — I50.20 UNSPECIFIED SYSTOLIC (CONGESTIVE) HEART FAILURE: ICD-10-CM

## 2024-10-04 DIAGNOSIS — I25.10 ATHEROSCLEROTIC HEART DISEASE OF NATIVE CORONARY ARTERY W/OUT ANGINA PECTORIS: ICD-10-CM

## 2024-10-04 DIAGNOSIS — I44.7 LEFT BUNDLE-BRANCH BLOCK, UNSPECIFIED: ICD-10-CM

## 2024-10-04 DIAGNOSIS — I10 ESSENTIAL (PRIMARY) HYPERTENSION: ICD-10-CM

## 2024-10-04 PROCEDURE — 99214 OFFICE O/P EST MOD 30 MIN: CPT

## 2024-10-04 PROCEDURE — 36415 COLL VENOUS BLD VENIPUNCTURE: CPT

## 2024-10-04 PROCEDURE — G2211 COMPLEX E/M VISIT ADD ON: CPT | Mod: NC

## 2024-10-04 NOTE — PLAN
[FreeTextEntry1] : Elevated PSA- patient will continue to follow up with Urologist  Afib-  Patient will continue all medication as prescribed. Patient will continue to follow with cardiologist   Prior to appointment and during encounter with patient extensive medical records were reviewed including but not limited to, Hospital records, out patient records, laboratory data and microbiology data    Total encounter total time 30 mins >50% of time spent counseling/coordinating care  Counseling included abnormal lab results, differential diagnoses, treatment options, risks and benefits, lifestyle changes, current condition, medications, and dose adjustments.  The patient was interactive, attentive, asked questions, and verbalized understanding

## 2024-10-04 NOTE — HISTORY OF PRESENT ILLNESS
[FreeTextEntry1] : follow up chronic medical conditions. [de-identified] : Mr. SONIA DAILEY is a 69 year male with a PMH of LBBB, possible TIA, and persistent atrial fibrillation on Eliquis, with associated cardiomyopathy s/p AF ablation 7/10/23 comes to the office for follow up chronic medical conditions. Patient denies fever, cough SOB. No other complaints at this time.

## 2024-10-06 NOTE — ASSESSMENT
[FreeTextEntry1] : Plan  -Plan for Fusion Prostate biopsy. Refer to Dr. Jenkins  -No additional changes for 24 hr urine findings at this time  -Follow up in the future for kidney stone management after prostate biopsy    I performed history/review of imaging, discussed treatment plan with patient, agree with above transcription by the KATHERYN

## 2024-10-06 NOTE — HISTORY OF PRESENT ILLNESS
[FreeTextEntry1] : 68 y/o M for follow up  Afib on Eliquis  Elevated PSA 3.68 (prev baseline 0.70) No familial hx of prostate cancer   Denies urgency, frequency, retention, hematuria. Nocturia 0-2x unchanged  Reviewed Prostate MRI 9/16/24: PIRADS 4 lesion noted. prostate volume 58.4 cc  Reviewed 24 hr urine (June '24): High vol, pH 6.8, High uric acid 933, high UUN, High PCR  discussed the need for fusion prostate biopsies briefly explained the procedure, possible recovery, and possible complications explained this will be done by Dr. Jenkins, and will help with scheduling   Plan  -Plan for Fusion Prostate biopsy. Refer to Dr. Jenkins  -No additional changes for 24 hr urine findings at this time  -Follow up in the future for kidney stone management after prostate biopsy

## 2024-10-06 NOTE — HISTORY OF PRESENT ILLNESS
[FreeTextEntry1] : 70 y/o M for follow up  Afib on Eliquis  Elevated PSA 3.68 (prev baseline 0.70) No familial hx of prostate cancer   Denies urgency, frequency, retention, hematuria. Nocturia 0-2x unchanged  Reviewed Prostate MRI 9/16/24: PIRADS 4 lesion noted. prostate volume 58.4 cc  Reviewed 24 hr urine (June '24): High vol, pH 6.8, High uric acid 933, high UUN, High PCR  discussed the need for fusion prostate biopsies briefly explained the procedure, possible recovery, and possible complications explained this will be done by Dr. Jenkins, and will help with scheduling   Plan  -Plan for Fusion Prostate biopsy. Refer to Dr. Jenkins  -No additional changes for 24 hr urine findings at this time  -Follow up in the future for kidney stone management after prostate biopsy

## 2024-10-07 LAB
25(OH)D3 SERPL-MCNC: 27.1 NG/ML
ALBUMIN SERPL ELPH-MCNC: 4.9 G/DL
ALP BLD-CCNC: 78 U/L
ALT SERPL-CCNC: 16 U/L
ANION GAP SERPL CALC-SCNC: 18 MMOL/L
AST SERPL-CCNC: 19 U/L
BASOPHILS # BLD AUTO: 0.05 K/UL
BASOPHILS NFR BLD AUTO: 0.9 %
BILIRUB SERPL-MCNC: 0.7 MG/DL
BUN SERPL-MCNC: 14 MG/DL
CALCIUM SERPL-MCNC: 9.9 MG/DL
CHLORIDE SERPL-SCNC: 100 MMOL/L
CHOLEST SERPL-MCNC: 102 MG/DL
CO2 SERPL-SCNC: 20 MMOL/L
CREAT SERPL-MCNC: 0.82 MG/DL
EGFR: 96 ML/MIN/1.73M2
EOSINOPHIL # BLD AUTO: 0.06 K/UL
EOSINOPHIL NFR BLD AUTO: 1 %
ESTIMATED AVERAGE GLUCOSE: 120 MG/DL
GLUCOSE SERPL-MCNC: 107 MG/DL
HBA1C MFR BLD HPLC: 5.8 %
HCT VFR BLD CALC: 47.1 %
HDLC SERPL-MCNC: 50 MG/DL
HGB BLD-MCNC: 15.1 G/DL
IMM GRANULOCYTES NFR BLD AUTO: 0.2 %
LDLC SERPL CALC-MCNC: 42 MG/DL
LYMPHOCYTES # BLD AUTO: 0.81 K/UL
LYMPHOCYTES NFR BLD AUTO: 13.8 %
MAN DIFF?: NORMAL
MCHC RBC-ENTMCNC: 29.2 PG
MCHC RBC-ENTMCNC: 32.1 GM/DL
MCV RBC AUTO: 91.1 FL
MONOCYTES # BLD AUTO: 0.43 K/UL
MONOCYTES NFR BLD AUTO: 7.3 %
NEUTROPHILS # BLD AUTO: 4.51 K/UL
NEUTROPHILS NFR BLD AUTO: 76.8 %
NONHDLC SERPL-MCNC: 53 MG/DL
PLATELET # BLD AUTO: 260 K/UL
POTASSIUM SERPL-SCNC: 4.4 MMOL/L
PROT SERPL-MCNC: 7.4 G/DL
RBC # BLD: 5.17 M/UL
RBC # FLD: 14.4 %
SODIUM SERPL-SCNC: 139 MMOL/L
TRIGL SERPL-MCNC: 39 MG/DL
TSH SERPL-ACNC: 2.66 UIU/ML
WBC # FLD AUTO: 5.87 K/UL

## 2024-10-11 ENCOUNTER — APPOINTMENT (OUTPATIENT)
Dept: DERMATOLOGY | Facility: CLINIC | Age: 69
End: 2024-10-11
Payer: COMMERCIAL

## 2024-10-11 PROCEDURE — 99203 OFFICE O/P NEW LOW 30 MIN: CPT

## 2024-10-16 ENCOUNTER — NON-APPOINTMENT (OUTPATIENT)
Age: 69
End: 2024-10-16

## 2024-10-16 ENCOUNTER — APPOINTMENT (OUTPATIENT)
Dept: CARDIOLOGY | Facility: CLINIC | Age: 69
End: 2024-10-16
Payer: COMMERCIAL

## 2024-10-16 VITALS
OXYGEN SATURATION: 100 % | HEART RATE: 68 BPM | DIASTOLIC BLOOD PRESSURE: 72 MMHG | WEIGHT: 179 LBS | BODY MASS INDEX: 24.24 KG/M2 | HEIGHT: 72 IN | SYSTOLIC BLOOD PRESSURE: 128 MMHG

## 2024-10-16 DIAGNOSIS — Z01.810 ENCOUNTER FOR PREPROCEDURAL CARDIOVASCULAR EXAMINATION: ICD-10-CM

## 2024-10-16 DIAGNOSIS — I25.10 ATHEROSCLEROTIC HEART DISEASE OF NATIVE CORONARY ARTERY W/OUT ANGINA PECTORIS: ICD-10-CM

## 2024-10-16 DIAGNOSIS — I42.9 CARDIOMYOPATHY, UNSPECIFIED: ICD-10-CM

## 2024-10-16 DIAGNOSIS — I10 ESSENTIAL (PRIMARY) HYPERTENSION: ICD-10-CM

## 2024-10-16 DIAGNOSIS — I48.91 UNSPECIFIED ATRIAL FIBRILLATION: ICD-10-CM

## 2024-10-16 PROCEDURE — 93000 ELECTROCARDIOGRAM COMPLETE: CPT

## 2024-10-16 PROCEDURE — 99215 OFFICE O/P EST HI 40 MIN: CPT | Mod: 25

## 2024-10-17 ENCOUNTER — NON-APPOINTMENT (OUTPATIENT)
Age: 69
End: 2024-10-17

## 2024-10-21 ENCOUNTER — APPOINTMENT (OUTPATIENT)
Dept: UROLOGY | Facility: CLINIC | Age: 69
End: 2024-10-21
Payer: COMMERCIAL

## 2024-10-21 VITALS
WEIGHT: 177 LBS | OXYGEN SATURATION: 97 % | SYSTOLIC BLOOD PRESSURE: 136 MMHG | HEART RATE: 74 BPM | RESPIRATION RATE: 16 BRPM | DIASTOLIC BLOOD PRESSURE: 71 MMHG | BODY MASS INDEX: 23.98 KG/M2 | HEIGHT: 72 IN

## 2024-10-21 PROBLEM — B35.6 TINEA CRURIS: Status: ACTIVE | Noted: 2024-10-21

## 2024-10-21 PROCEDURE — 99213 OFFICE O/P EST LOW 20 MIN: CPT

## 2024-10-21 RX ORDER — CLOTRIMAZOLE AND BETAMETHASONE DIPROPIONATE 10; .5 MG/G; MG/G
1-0.05 CREAM TOPICAL
Qty: 1 | Refills: 2 | Status: ACTIVE | COMMUNITY
Start: 2024-10-21 | End: 1900-01-01

## 2024-10-30 ENCOUNTER — APPOINTMENT (OUTPATIENT)
Dept: UROLOGY | Facility: CLINIC | Age: 69
End: 2024-10-30
Payer: COMMERCIAL

## 2024-10-30 DIAGNOSIS — B35.6 TINEA CRURIS: ICD-10-CM

## 2024-10-30 PROCEDURE — 99213 OFFICE O/P EST LOW 20 MIN: CPT

## 2024-10-31 ENCOUNTER — RX RENEWAL (OUTPATIENT)
Age: 69
End: 2024-10-31

## 2024-11-06 ENCOUNTER — RX RENEWAL (OUTPATIENT)
Age: 69
End: 2024-11-06

## 2024-11-08 NOTE — H&P PST ADULT - EKG AND INTERPRETATION
Thank you for allowing us to care for you at [facility] today. Thank you for your patience.     You have been seen and evaluated. Your labs and chest xray were unremarkable. Your EKG had very subtle changes to it, but this appears consistent with your EKG from 2022.    Someone will call you from the cardiology fast-track clinic team for follow-up.    Please follow-up with your doctor within 1-2 days for a follow-up check to ensure you are  improving, to see if you need any further evaluation/testing, or to evaluate for any alternate diagnoses. If you do not have a primary care provider, call [phone] and they will be able to assist you further.     Please return to the emergency department if you develop severe and persistent chest pain, difficulty breathing, back pain, lightheadedness or dizziness, leg swelling, fevers, coughing up blood, feeling ill, or worsening symptoms as these can be signs of a medical emergency. Call or return to the ER for any other concerning symptoms.     We hope you feel better.     
HR 45, sinus bradycardia w/1st degree AV block, LBBB; no acute change from prior 12 lead EKG, official reading pending

## 2024-11-19 ENCOUNTER — NON-APPOINTMENT (OUTPATIENT)
Age: 69
End: 2024-11-19

## 2024-11-29 ENCOUNTER — NON-APPOINTMENT (OUTPATIENT)
Age: 69
End: 2024-11-29

## 2024-12-04 ENCOUNTER — APPOINTMENT (OUTPATIENT)
Dept: UROLOGY | Facility: CLINIC | Age: 69
End: 2024-12-04
Payer: COMMERCIAL

## 2024-12-04 VITALS
DIASTOLIC BLOOD PRESSURE: 82 MMHG | RESPIRATION RATE: 16 BRPM | SYSTOLIC BLOOD PRESSURE: 133 MMHG | HEART RATE: 70 BPM | WEIGHT: 175 LBS | OXYGEN SATURATION: 97 % | BODY MASS INDEX: 23.7 KG/M2 | HEIGHT: 72 IN

## 2024-12-04 VITALS — DIASTOLIC BLOOD PRESSURE: 79 MMHG | HEART RATE: 76 BPM | SYSTOLIC BLOOD PRESSURE: 127 MMHG

## 2024-12-04 PROCEDURE — 55700: CPT

## 2024-12-04 PROCEDURE — 76999F: CUSTOM

## 2024-12-06 NOTE — H&P PST ADULT - OTHER CARE PROVIDERS
Cardiologist: Dr. Norton C1D1 on 11/6 : [place] [id number] regimen: Rituximab day 0, decadron days 1-7, 15-21, vincristine and danu days 1, 8, 15, 22, PEG day 18, L.P.: day 1 and day +8 (planned)  Given high sugars decrease decadron by 25% for days 4 -7  11/1 G6PD 14.5.  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 B-lymphoblasts (44% of cells), positive for dim to negative CD45, HLA-DR, partial CD38, CD34 (partial), CD19, CD10, CD20 (dim), CD22 (dim), CD58, CRLF2, CD9 ; negative , CD33, CD3,, CD15, CD14, CD16, CD64; consistent with B-lymphoblastic leukemia/lymphoma.   -CT C/A/P w/ contrast 11/29 shows good response to treatment -with LAD and splenomegaly resolved   -will plan to dose reduce next cycle considering side effects including hyperbilirubinemia, stomatitis.   Hgb goal > 7.0. Plt goal >10k, 15k if febrile, 20k if minor bleeding  #Tumor Lysis Syndrome   Uric acid 9 on admission, given 3 gram rasburicase  -check TLS labs every daily and DIC (D-dimer, PT, PTT, Fibrinogen ) daily.   - IV fluid at 150cc/hr--off fluids now  day 29 BM bx/ -delayed to occur day 30 (12/05) due to eating on 12/04 post LP with intrathecal MTX.  -LP with intrathecal MTX -occurred on day 29 (12/04), sent with clonase  HCT 12/02 negative-done to evaluate HA/facial pain/dizziness. C1D1 on 11/6 : [place] [id number] regimen: Rituximab day 0, decadron days 1-7, 15-21, vincristine and danu days 1, 8, 15, 22, PEG day 18, L.P.: day 1 and day +8 (planned)  Given high sugars decrease decadron by 25% for days 4 -7  Day 31 (12/06) today   11/1 G6PD 14.5.  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 B-lymphoblasts (44% of cells), positive for dim to negative CD45, HLA-DR, partial CD38, CD34 (partial), CD19, CD10, CD20 (dim), CD22 (dim), CD58, CRLF2, CD9 ; negative , CD33, CD3,, CD15, CD14, CD16, CD64; consistent with B-lymphoblastic leukemia/lymphoma.   -CT C/A/P w/ contrast 11/29 shows good response to treatment -with LAD and splenomegaly resolved   -will plan to dose reduce next cycle considering side effects including hyperbilirubinemia, stomatitis.   Hgb goal > 7.0. Plt goal >10k, 15k if febrile, 20k if minor bleeding  #Tumor Lysis Syndrome   Uric acid 9 on admission, given 3 gram rasburicase  -check TLS labs every daily and DIC (D-dimer, PT, PTT, Fibrinogen ) daily.   - IV fluid at 150cc/hr--off fluids now  day 29 BM bx/ -delayed to occur day 30 (12/05) due to eating on 12/04 post LP with intrathecal MTX.  -LP with intrathecal MTX -occurred on day 29 (12/04), sent with Atrenta, flow -insufficient cells, neg  HCT 12/02 negative-done to evaluate HA/facial pain/dizziness.

## 2024-12-09 ENCOUNTER — APPOINTMENT (OUTPATIENT)
Dept: UROLOGY | Facility: CLINIC | Age: 69
End: 2024-12-09

## 2024-12-09 LAB — CORE LAB BIOPSY: NORMAL

## 2024-12-16 ENCOUNTER — APPOINTMENT (OUTPATIENT)
Dept: UROLOGY | Facility: CLINIC | Age: 69
End: 2024-12-16
Payer: COMMERCIAL

## 2024-12-16 DIAGNOSIS — R97.20 ELEVATED PROSTATE, SPECIFIC ANTIGEN [PSA]: ICD-10-CM

## 2024-12-16 PROCEDURE — 99213 OFFICE O/P EST LOW 20 MIN: CPT

## 2025-01-13 ENCOUNTER — APPOINTMENT (OUTPATIENT)
Dept: UROLOGY | Facility: CLINIC | Age: 70
End: 2025-01-13
Payer: COMMERCIAL

## 2025-01-13 DIAGNOSIS — R97.20 ELEVATED PROSTATE, SPECIFIC ANTIGEN [PSA]: ICD-10-CM

## 2025-01-13 DIAGNOSIS — N20.0 CALCULUS OF KIDNEY: ICD-10-CM

## 2025-01-13 PROCEDURE — 99214 OFFICE O/P EST MOD 30 MIN: CPT

## 2025-03-31 ENCOUNTER — APPOINTMENT (OUTPATIENT)
Dept: INTERNAL MEDICINE | Facility: CLINIC | Age: 70
End: 2025-03-31
Payer: COMMERCIAL

## 2025-03-31 VITALS
HEIGHT: 72 IN | HEART RATE: 79 BPM | WEIGHT: 187 LBS | BODY MASS INDEX: 25.33 KG/M2 | DIASTOLIC BLOOD PRESSURE: 77 MMHG | SYSTOLIC BLOOD PRESSURE: 135 MMHG

## 2025-03-31 DIAGNOSIS — R97.20 ELEVATED PROSTATE, SPECIFIC ANTIGEN [PSA]: ICD-10-CM

## 2025-03-31 DIAGNOSIS — R73.01 IMPAIRED FASTING GLUCOSE: ICD-10-CM

## 2025-03-31 DIAGNOSIS — I50.20 UNSPECIFIED SYSTOLIC (CONGESTIVE) HEART FAILURE: ICD-10-CM

## 2025-03-31 DIAGNOSIS — R53.83 OTHER FATIGUE: ICD-10-CM

## 2025-03-31 DIAGNOSIS — R73.03 PREDIABETES.: ICD-10-CM

## 2025-03-31 DIAGNOSIS — I48.91 UNSPECIFIED ATRIAL FIBRILLATION: ICD-10-CM

## 2025-03-31 DIAGNOSIS — I25.10 ATHEROSCLEROTIC HEART DISEASE OF NATIVE CORONARY ARTERY W/OUT ANGINA PECTORIS: ICD-10-CM

## 2025-03-31 DIAGNOSIS — Z00.00 ENCOUNTER FOR GENERAL ADULT MEDICAL EXAMINATION W/OUT ABNORMAL FINDINGS: ICD-10-CM

## 2025-03-31 DIAGNOSIS — I10 ESSENTIAL (PRIMARY) HYPERTENSION: ICD-10-CM

## 2025-03-31 DIAGNOSIS — R79.9 ABNORMAL FINDING OF BLOOD CHEMISTRY, UNSPECIFIED: ICD-10-CM

## 2025-03-31 PROCEDURE — 36415 COLL VENOUS BLD VENIPUNCTURE: CPT

## 2025-03-31 PROCEDURE — 99397 PER PM REEVAL EST PAT 65+ YR: CPT

## 2025-04-01 LAB
ALBUMIN SERPL ELPH-MCNC: 4.8 G/DL
ALP BLD-CCNC: 74 U/L
ALT SERPL-CCNC: 17 U/L
ANION GAP SERPL CALC-SCNC: 10 MMOL/L
AST SERPL-CCNC: 21 U/L
BASOPHILS # BLD AUTO: 0.03 K/UL
BASOPHILS NFR BLD AUTO: 0.6 %
BILIRUB SERPL-MCNC: 0.7 MG/DL
BUN SERPL-MCNC: 17 MG/DL
CALCIUM SERPL-MCNC: 9.8 MG/DL
CHLORIDE SERPL-SCNC: 101 MMOL/L
CHOLEST SERPL-MCNC: 109 MG/DL
CO2 SERPL-SCNC: 26 MMOL/L
CREAT SERPL-MCNC: 0.8 MG/DL
EGFRCR SERPLBLD CKD-EPI 2021: 96 ML/MIN/1.73M2
EOSINOPHIL # BLD AUTO: 0.08 K/UL
EOSINOPHIL NFR BLD AUTO: 1.5 %
ESTIMATED AVERAGE GLUCOSE: 114 MG/DL
GLUCOSE SERPL-MCNC: 91 MG/DL
HBA1C MFR BLD HPLC: 5.6 %
HCT VFR BLD CALC: 42.6 %
HDLC SERPL-MCNC: 48 MG/DL
HGB BLD-MCNC: 14.3 G/DL
IMM GRANULOCYTES NFR BLD AUTO: 0.2 %
LDLC SERPL-MCNC: 49 MG/DL
LYMPHOCYTES # BLD AUTO: 1.29 K/UL
LYMPHOCYTES NFR BLD AUTO: 24.9 %
MAN DIFF?: NORMAL
MCHC RBC-ENTMCNC: 30.3 PG
MCHC RBC-ENTMCNC: 33.6 G/DL
MCV RBC AUTO: 90.3 FL
MONOCYTES # BLD AUTO: 0.41 K/UL
MONOCYTES NFR BLD AUTO: 7.9 %
NEUTROPHILS # BLD AUTO: 3.36 K/UL
NEUTROPHILS NFR BLD AUTO: 64.9 %
NONHDLC SERPL-MCNC: 61 MG/DL
PLATELET # BLD AUTO: 216 K/UL
POTASSIUM SERPL-SCNC: 4.4 MMOL/L
PROT SERPL-MCNC: 7 G/DL
RBC # BLD: 4.72 M/UL
RBC # FLD: 13.3 %
SODIUM SERPL-SCNC: 137 MMOL/L
TRIGL SERPL-MCNC: 52 MG/DL
TSH SERPL-ACNC: 2.63 UIU/ML
WBC # FLD AUTO: 5.18 K/UL

## 2025-04-22 ENCOUNTER — APPOINTMENT (OUTPATIENT)
Dept: CARDIOLOGY | Facility: CLINIC | Age: 70
End: 2025-04-22

## 2025-04-30 ENCOUNTER — APPOINTMENT (OUTPATIENT)
Dept: ULTRASOUND IMAGING | Facility: CLINIC | Age: 70
End: 2025-04-30

## 2025-04-30 ENCOUNTER — OUTPATIENT (OUTPATIENT)
Dept: OUTPATIENT SERVICES | Facility: HOSPITAL | Age: 70
LOS: 1 days | End: 2025-04-30
Payer: COMMERCIAL

## 2025-04-30 DIAGNOSIS — Z98.890 OTHER SPECIFIED POSTPROCEDURAL STATES: Chronic | ICD-10-CM

## 2025-04-30 DIAGNOSIS — Z00.8 ENCOUNTER FOR OTHER GENERAL EXAMINATION: ICD-10-CM

## 2025-04-30 PROCEDURE — 76770 US EXAM ABDO BACK WALL COMP: CPT

## 2025-04-30 PROCEDURE — 76770 US EXAM ABDO BACK WALL COMP: CPT | Mod: 26

## 2025-05-05 ENCOUNTER — APPOINTMENT (OUTPATIENT)
Dept: UROLOGY | Facility: CLINIC | Age: 70
End: 2025-05-05
Payer: COMMERCIAL

## 2025-05-05 VITALS
SYSTOLIC BLOOD PRESSURE: 136 MMHG | HEIGHT: 72 IN | BODY MASS INDEX: 25.47 KG/M2 | WEIGHT: 188 LBS | HEART RATE: 73 BPM | DIASTOLIC BLOOD PRESSURE: 81 MMHG

## 2025-05-05 DIAGNOSIS — R97.20 ELEVATED PROSTATE, SPECIFIC ANTIGEN [PSA]: ICD-10-CM

## 2025-05-05 DIAGNOSIS — N20.0 CALCULUS OF KIDNEY: ICD-10-CM

## 2025-05-05 PROCEDURE — 99213 OFFICE O/P EST LOW 20 MIN: CPT

## 2025-05-13 NOTE — ED ADULT TRIAGE NOTE - DIRECT TO ROOM CARE INITIATED:
Problem: PAIN - ADULT  Goal: Verbalizes/displays adequate comfort level or baseline comfort level  Description: Interventions:- Encourage patient to monitor pain and request assistance- Assess pain using appropriate pain scale- Administer analgesics based on type and severity of pain and evaluate response- Implement non-pharmacological measures as appropriate and evaluate response- Consider cultural and social influences on pain and pain management- Notify physician/advanced practitioner if interventions unsuccessful or patient reports new pain  Outcome: Progressing     Problem: INFECTION - ADULT  Goal: Absence or prevention of progression during hospitalization  Description: INTERVENTIONS:- Assess and monitor for signs and symptoms of infection- Monitor lab/diagnostic results- Monitor all insertion sites, i.e. indwelling lines, tubes, and drains- Monitor endotracheal if appropriate and nasal secretions for changes in amount and color- [place] appropriate cooling/warming therapies per order- Administer medications as ordered- Instruct and encourage patient and family to use good hand hygiene technique- Identify and instruct in appropriate isolation precautions for identified infection/condition  Outcome: Progressing  Goal: Absence of fever/infection during neutropenic period  Description: INTERVENTIONS:- Monitor WBC  Outcome: Progressing     Problem: SAFETY ADULT  Goal: Patient will remain free of falls  Description: INTERVENTIONS:- Educate patient/family on patient safety including physical limitations- Instruct patient to call for assistance with activity - Consult OT/PT to assist with strengthening/mobility - Keep Call bell within reach- Keep bed low and locked with side rails adjusted as appropriate- Keep care items and personal belongings within reach- Initiate and maintain comfort rounds- Make Fall Risk Sign visible to staff- Offer Toileting every 2 Hours, in advance of need- Initiate/Maintain bed alarm-  Goal Outcome Evaluation: Progressing    Reason for Admission: Sepsis    Evaluation of Goal:   Patient is A&Ox 3-4; occasionally disoriented to time  but able to be reoriented easily. Vitals are stable on CPAP overnight. Tele NSR. Patient is up with assist of one with gait belt and walker. No bowel movement overnight; couple episodes of urinary incontinence. Patient quite restless for majority of the night. PRN tylenol was given twice for a headache. Patient scoring green on the Aggression Stoplight Tool. TPN running @ 40 mL/hr through PICC line. Pt calm and cooperative with cares, able to make needs known, call light within reach, bed alarm on for safety. Discharge disposition is pending.     Skylar Mark RN on 12/30/2023 at 6:36 AM        Obtain necessary fall risk management equipment: - Apply yellow socks and bracelet for high fall risk patients- Consider moving patient to room near nurses station  Outcome: Not Progressing  Goal: Maintain or return to baseline ADL function  Description: INTERVENTIONS:-  Assess patient's ability to carry out ADLs; assess patient's baseline for ADL function and identify physical deficits which impact ability to perform ADLs (bathing, care of mouth/teeth, toileting, grooming, dressing, etc.)- Assess/evaluate cause of self-care deficits - Assess range of motion- Assess patient's mobility; develop plan if impaired- Assess patient's need for assistive devices and provide as appropriate- Encourage maximum independence but intervene and supervise when necessary- Involve family in performance of ADLs- Assess for home care needs following discharge - Consider OT consult to assist with ADL evaluation and planning for discharge- Provide patient education as appropriate  Outcome: Not Progressing  Goal: Maintains/Returns to pre admission functional level  Description: INTERVENTIONS:- Perform AM-PAC 6 Click Basic Mobility/ Daily Activity assessment daily.- Set and communicate daily mobility goal to care team and patient/family/caregiver. - Collaborate with rehabilitation services on mobility goals if consulted- Perform Range of Motion 3 times a day.- Reposition patient every 2 hours.- Dangle patient 3 times a day- Stand patient 3 times a day- Ambulate patient 3 times a day- Out of bed to chair 3 times a day - Out of bed for meals 3 times a day- Out of bed for toileting- Record patient progress and toleration of activity level   Outcome: Not Progressing     Problem: DISCHARGE PLANNING  Goal: Discharge to home or other facility with appropriate resources  Description: INTERVENTIONS:- Identify barriers to discharge w/patient and caregiver- Arrange for needed discharge resources and transportation as appropriate- Identify discharge  learning needs (meds, wound care, etc.)- Arrange for interpretive services to assist at discharge as needed- Refer to Case Management Department for coordinating discharge planning if the patient needs post-hospital services based on physician/advanced practitioner order or complex needs related to functional status, cognitive ability, or social support system  Outcome: Not Progressing     Problem: Knowledge Deficit  Goal: Patient/family/caregiver demonstrates understanding of disease process, treatment plan, medications, and discharge instructions  Description: Complete learning assessment and assess knowledge base.Interventions:- Provide teaching at level of understanding- Provide teaching via preferred learning methods  Outcome: Not Progressing     Problem: Nutrition/Hydration-ADULT  Goal: Nutrient/Hydration intake appropriate for improving, restoring or maintaining nutritional needs  Description: Monitor and assess patient's nutrition/hydration status for malnutrition. Collaborate with interdisciplinary team and initiate plan and interventions as ordered.  Monitor patient's weight and dietary intake as ordered or per policy. Utilize nutrition screening tool and intervene as necessary. Determine patient's food preferences and provide high-protein, high-caloric foods as appropriate. INTERVENTIONS:- Monitor oral intake, urinary output, labs, and treatment plans- Assess nutrition and hydration status and recommend course of action- Evaluate amount of meals eaten- Assist patient with eating if necessary - Allow adequate time for meals- Recommend/ encourage appropriate diets, oral nutritional supplements, and vitamin/mineral supplements- Order, calculate, and assess calorie counts as needed- Recommend, monitor, and adjust tube feedings and TPN/PPN based on assessed needs- Assess need for intravenous fluids- Provide specific nutrition/hydration education as appropriate- Include patient/family/caregiver in decisions  related to nutrition  Monitor and assess patient's nutrition/hydration status for malnutrition. Collaborate with interdisciplinary team and initiate plan and interventions as ordered.  Monitor patient's weight and dietary intake as ordered or per policy. Utilize nutrition screening tool and intervene as necessary. Determine patient's food preferences and provide high-protein, high-caloric foods as appropriate. INTERVENTIONS:- Monitor oral intake, urinary output, labs, and treatment plans- Assess nutrition and hydration status and recommend course of action- Evaluate amount of meals eaten- Assist patient with eating if necessary - Allow adequate time for meals- Recommend/ encourage appropriate diets, oral nutritional supplements, and vitamin/mineral supplements- Order, calculate, and assess calorie counts as needed- Recommend, monitor, and adjust tube feedings and TPN/PPN based on assessed needs- Assess need for intravenous fluids- Provide specific nutrition/hydration education as appropriate- Include patient/family/caregiver in decisions related to nutrition  Outcome: Not Progressing     Problem: HEMATOLOGIC - ADULT  Goal: Maintains hematologic stability  Description: INTERVENTIONS- Assess for signs and symptoms of bleeding or hemorrhage- Monitor labs- Administer supportive blood products/factors as ordered and appropriate  Outcome: Not Progressing     Problem: Communication Impairment  Goal: Ability to express needs and understand communication  Description: Assess patient's communication skills and ability to understand information.  Patient will demonstrate use of effective communication techniques, alternative methods of communication and understanding even if not able to speak. - Encourage communication and provide alternate methods of communication as needed.- Collaborate with case management/ for discharge needs.- Include patient/family/caregiver in decisions related to communication.  Outcome:  Not Progressing      07-Mar-2017 18:25 Bi-Rhombic Flap Text: The defect edges were debeveled with a #15 scalpel blade.  Given the location of the defect and the proximity to free margins a bi-rhombic flap was deemed most appropriate.  Using a sterile surgical marker, an appropriate rhombic flap was drawn incorporating the defect. The area thus outlined was incised deep to adipose tissue with a #15 scalpel blade.  The skin margins were undermined to an appropriate distance in all directions utilizing iris scissors.

## 2025-05-15 ENCOUNTER — APPOINTMENT (OUTPATIENT)
Dept: CARDIOLOGY | Facility: CLINIC | Age: 70
End: 2025-05-15

## 2025-05-15 ENCOUNTER — NON-APPOINTMENT (OUTPATIENT)
Age: 70
End: 2025-05-15

## 2025-05-15 VITALS
HEART RATE: 69 BPM | BODY MASS INDEX: 25.47 KG/M2 | WEIGHT: 188 LBS | HEIGHT: 72 IN | SYSTOLIC BLOOD PRESSURE: 128 MMHG | OXYGEN SATURATION: 99 % | DIASTOLIC BLOOD PRESSURE: 78 MMHG

## 2025-05-15 DIAGNOSIS — I48.91 UNSPECIFIED ATRIAL FIBRILLATION: ICD-10-CM

## 2025-05-15 DIAGNOSIS — I42.9 CARDIOMYOPATHY, UNSPECIFIED: ICD-10-CM

## 2025-05-15 DIAGNOSIS — I10 ESSENTIAL (PRIMARY) HYPERTENSION: ICD-10-CM

## 2025-05-15 PROCEDURE — 93000 ELECTROCARDIOGRAM COMPLETE: CPT

## 2025-05-15 PROCEDURE — 99215 OFFICE O/P EST HI 40 MIN: CPT | Mod: 25

## 2025-05-30 ENCOUNTER — APPOINTMENT (OUTPATIENT)
Dept: CARDIOLOGY | Facility: CLINIC | Age: 70
End: 2025-05-30

## 2025-05-30 PROCEDURE — 93306 TTE W/DOPPLER COMPLETE: CPT

## 2025-05-30 PROCEDURE — 93356 MYOCRD STRAIN IMG SPCKL TRCK: CPT

## 2025-07-14 NOTE — H&P PST ADULT - PATIENT'S PREFERRED PRONOUN
Oral steroid provided (do not take NSAIDs while taking steroid).  Will consider physical therapy referral if symptoms persist.     Follow up with PCP if these symptoms worsen or fail to improve as anticipated.      Him/He

## 2025-08-07 ENCOUNTER — OUTPATIENT (OUTPATIENT)
Dept: OUTPATIENT SERVICES | Facility: HOSPITAL | Age: 70
LOS: 1 days | End: 2025-08-07
Payer: COMMERCIAL

## 2025-08-07 ENCOUNTER — APPOINTMENT (OUTPATIENT)
Dept: ULTRASOUND IMAGING | Facility: CLINIC | Age: 70
End: 2025-08-07
Payer: COMMERCIAL

## 2025-08-07 DIAGNOSIS — Z98.890 OTHER SPECIFIED POSTPROCEDURAL STATES: Chronic | ICD-10-CM

## 2025-08-07 DIAGNOSIS — N20.0 CALCULUS OF KIDNEY: ICD-10-CM

## 2025-08-07 PROCEDURE — 76770 US EXAM ABDO BACK WALL COMP: CPT | Mod: 26

## 2025-08-07 PROCEDURE — 76770 US EXAM ABDO BACK WALL COMP: CPT

## 2025-08-11 ENCOUNTER — APPOINTMENT (OUTPATIENT)
Dept: UROLOGY | Facility: CLINIC | Age: 70
End: 2025-08-11

## 2025-08-11 DIAGNOSIS — Z87.442 PERSONAL HISTORY OF URINARY CALCULI: ICD-10-CM

## 2025-08-11 DIAGNOSIS — R10.9 UNSPECIFIED ABDOMINAL PAIN: ICD-10-CM

## 2025-08-11 PROCEDURE — 99215 OFFICE O/P EST HI 40 MIN: CPT

## 2025-08-11 PROCEDURE — ZZZZZ: CPT

## (undated) DEVICE — VISITEC 4X4

## (undated) DEVICE — SNARE CAPTIVATOR COLD RND STIFF 10X2.4X2.8MM 240CM

## (undated) DEVICE — SOL IRR BAG NS 0.9% 3000ML

## (undated) DEVICE — SPECIMEN CONTAINER 100ML

## (undated) DEVICE — IRR BULB PATHFINDER + 10"

## (undated) DEVICE — POLY TRAP ETRAP

## (undated) DEVICE — PACK LITHOTOMY

## (undated) DEVICE — SYR CONTROL LUER LOK 10CC

## (undated) DEVICE — GLV 7.5 PROTEXIS (WHITE)

## (undated) DEVICE — VENODYNE/SCD SLEEVE CALF LARGE

## (undated) DEVICE — DRAPE C ARM UNIVERSAL

## (undated) DEVICE — SYR CATH TIP 2 OZ

## (undated) DEVICE — VALVE ENDO SURESEAL II 0-5FR

## (undated) DEVICE — Device

## (undated) DEVICE — BLADE SURGICAL #10 CARBON

## (undated) DEVICE — CSC-COLONOSCOPE 2002772: Type: DURABLE MEDICAL EQUIPMENT

## (undated) DEVICE — TUBING TUR 2 PRONG

## (undated) DEVICE — SYR LUER LOK 5CC

## (undated) DEVICE — TUBING CONNECTING 6MM 20FT

## (undated) DEVICE — GOWN TRIMAX LG

## (undated) DEVICE — WARMING BLANKET UPPER ADULT

## (undated) DEVICE — SUT VICRYL 2-0 27" SH UNDYED

## (undated) DEVICE — LAP PAD W RING 18 X 18"

## (undated) DEVICE — NDL BIOPSY TROCAR TWO-PART 18G X 20CM

## (undated) DEVICE — SOL IRR BAG H2O 3000ML

## (undated) DEVICE — SYR LUER LOK 10CC

## (undated) DEVICE — DRAPE 3/4 SHEET 52X76"

## (undated) DEVICE — DRAPE TOWEL BLUE 17" X 24"

## (undated) DEVICE — PORT BIOPSY

## (undated) DEVICE — DRAPE NEPHROSCOPY 72X118"